# Patient Record
Sex: FEMALE | Race: BLACK OR AFRICAN AMERICAN | Employment: FULL TIME | URBAN - METROPOLITAN AREA
[De-identification: names, ages, dates, MRNs, and addresses within clinical notes are randomized per-mention and may not be internally consistent; named-entity substitution may affect disease eponyms.]

---

## 2019-06-02 ENCOUNTER — HOSPITAL ENCOUNTER (OUTPATIENT)
Age: 54
Discharge: HOME | End: 2019-06-02
Attending: FAMILY MEDICINE
Payer: COMMERCIAL

## 2019-06-02 VITALS
RESPIRATION RATE: 20 BRPM | TEMPERATURE: 99 F | WEIGHT: 289 LBS | OXYGEN SATURATION: 97 % | HEART RATE: 115 BPM | BODY MASS INDEX: 45.36 KG/M2 | HEIGHT: 67 IN | DIASTOLIC BLOOD PRESSURE: 83 MMHG | SYSTOLIC BLOOD PRESSURE: 133 MMHG

## 2019-06-02 DIAGNOSIS — J01.00 ACUTE NON-RECURRENT MAXILLARY SINUSITIS: Primary | ICD-10-CM

## 2019-06-02 DIAGNOSIS — H10.502 BLEPHAROCONJUNCTIVITIS OF LEFT EYE, UNSPECIFIED BLEPHAROCONJUNCTIVITIS TYPE: ICD-10-CM

## 2019-06-02 PROCEDURE — 99204 OFFICE O/P NEW MOD 45 MIN: CPT

## 2019-06-02 PROCEDURE — 99203 OFFICE O/P NEW LOW 30 MIN: CPT

## 2019-06-02 RX ORDER — AMOXICILLIN AND CLAVULANATE POTASSIUM 875; 125 MG/1; MG/1
1 TABLET, FILM COATED ORAL 2 TIMES DAILY
Qty: 20 TABLET | Refills: 0 | Status: SHIPPED | OUTPATIENT
Start: 2019-06-02 | End: 2019-06-12

## 2019-06-02 RX ORDER — BENZONATATE 100 MG/1
100 CAPSULE ORAL 3 TIMES DAILY PRN
Qty: 30 CAPSULE | Refills: 0 | Status: SHIPPED | OUTPATIENT
Start: 2019-06-02 | End: 2019-07-02

## 2019-06-02 NOTE — ED PROVIDER NOTES
Patient Seen in: [facility] In [facility]    History   Patient presents with:  Redness  Cold  Sinus Problem  Ear Pain    Stated Complaint: COLD/LARYNGITIS/LEFT EYE REDNESS X 1WK    HPI    26-year-old female with a history of hysterectomy and [06/02/19 1237]   /83   Pulse 115   Resp 20   Temp 98.5 °F (36.9 °C)   Temp src Temporal   SpO2 97 %   O2 Device None (Room air)       Current:/83   Pulse 115   Temp 98.5 °F (36.9 °C) (Temporal)   Resp 20   Ht 170.2 cm (5' 7\")   Wt 131.1 kg for 10 days. , Normal, Disp-20 tablet, R-0    benzonatate 100 MG Oral Cap  Take 1 capsule (100 mg total) by mouth 3 (three) times daily as needed for cough., Normal, Disp-30 capsule, R-0

## 2019-06-02 NOTE — ED INITIAL ASSESSMENT (HPI)
C/o started as scratchy throat a week ago, sinus/facial pressure, headache, stuffy nose, post nasal drip and a little cough, losing voice. Left eye redness started on Friday, shut closed this morning. Last night right ear pain and not able to hear good.

## 2019-10-06 ENCOUNTER — HOSPITAL ENCOUNTER (OUTPATIENT)
Age: 54
Discharge: HOME | End: 2019-10-06
Payer: COMMERCIAL

## 2019-10-06 ENCOUNTER — APPOINTMENT (OUTPATIENT)
Dept: GENERAL RADIOLOGY | Age: 54
End: 2019-10-06
Attending: NURSE PRACTITIONER
Payer: COMMERCIAL

## 2019-10-06 VITALS
HEART RATE: 95 BPM | OXYGEN SATURATION: 98 % | WEIGHT: 285 LBS | BODY MASS INDEX: 44.73 KG/M2 | RESPIRATION RATE: 20 BRPM | SYSTOLIC BLOOD PRESSURE: 145 MMHG | DIASTOLIC BLOOD PRESSURE: 99 MMHG | TEMPERATURE: 98 F | HEIGHT: 67 IN

## 2019-10-06 DIAGNOSIS — M54.50 LUMBAR PAIN: Primary | ICD-10-CM

## 2019-10-06 PROCEDURE — 72100 X-RAY EXAM L-S SPINE 2/3 VWS: CPT | Performed by: NURSE PRACTITIONER

## 2019-10-06 PROCEDURE — 96372 THER/PROPH/DIAG INJ SC/IM: CPT

## 2019-10-06 PROCEDURE — 99214 OFFICE O/P EST MOD 30 MIN: CPT

## 2019-10-06 RX ORDER — PREDNISONE 20 MG/1
40 TABLET ORAL DAILY
Qty: 10 TABLET | Refills: 0 | Status: SHIPPED | OUTPATIENT
Start: 2019-10-06 | End: 2019-10-11

## 2019-10-06 RX ORDER — CYCLOBENZAPRINE HCL 10 MG
10 TABLET ORAL 3 TIMES DAILY PRN
Qty: 20 TABLET | Refills: 0 | Status: SHIPPED | OUTPATIENT
Start: 2019-10-06 | End: 2019-10-13

## 2019-10-06 RX ORDER — KETOROLAC TROMETHAMINE 30 MG/ML
30 INJECTION, SOLUTION INTRAMUSCULAR; INTRAVENOUS ONCE
Status: COMPLETED | OUTPATIENT
Start: 2019-10-06 | End: 2019-10-06

## 2019-10-06 NOTE — ED PROVIDER NOTES
Patient Seen in: [facility] In [facility]      History   Patient presents with:  Back Pain (musculoskeletal)    Stated Complaint: back pain x 5 days    HPI  48year old female with history of lower back pain that comes and goes for years pres negative except as noted above.     Physical Exam     ED Triage Vitals [10/06/19 1304]   BP (!) 145/99   Pulse 95   Resp 20   Temp 98.3 °F (36.8 °C)   Temp src Temporal   SpO2 98 %   O2 Device None (Room air)       Current:BP (!) 145/99   Pulse 95   Temp 98 lower back pain for five days, with no injury. FINDINGS:    BONES:  Minimal levoscoliosis. Preservation lumbar vertebral body height. There is multilevel endplate hypertrophic changes throughout the lumbar spine. No spondylolisthesis.   There are dege

## 2020-11-06 ENCOUNTER — APPOINTMENT (OUTPATIENT)
Dept: CT IMAGING | Facility: HOSPITAL | Age: 55
End: 2020-11-06
Attending: EMERGENCY MEDICINE
Payer: COMMERCIAL

## 2020-11-06 ENCOUNTER — HOSPITAL ENCOUNTER (EMERGENCY)
Facility: HOSPITAL | Age: 55
Discharge: HOME | End: 2020-11-06
Attending: EMERGENCY MEDICINE
Payer: COMMERCIAL

## 2020-11-06 VITALS
OXYGEN SATURATION: 97 % | BODY MASS INDEX: 48.82 KG/M2 | HEIGHT: 65 IN | SYSTOLIC BLOOD PRESSURE: 138 MMHG | WEIGHT: 293 LBS | RESPIRATION RATE: 14 BRPM | DIASTOLIC BLOOD PRESSURE: 94 MMHG | HEART RATE: 87 BPM | TEMPERATURE: 97 F

## 2020-11-06 DIAGNOSIS — T81.49XA ABDOMINAL WALL ABSCESS AT SITE OF SURGICAL WOUND: ICD-10-CM

## 2020-11-06 DIAGNOSIS — L03.311 ABDOMINAL WALL CELLULITIS: Primary | ICD-10-CM

## 2020-11-06 PROCEDURE — 87205 SMEAR GRAM STAIN: CPT | Performed by: EMERGENCY MEDICINE

## 2020-11-06 PROCEDURE — 87070 CULTURE OTHR SPECIMN AEROBIC: CPT | Performed by: EMERGENCY MEDICINE

## 2020-11-06 PROCEDURE — 85025 COMPLETE CBC W/AUTO DIFF WBC: CPT | Performed by: EMERGENCY MEDICINE

## 2020-11-06 PROCEDURE — 80053 COMPREHEN METABOLIC PANEL: CPT | Performed by: EMERGENCY MEDICINE

## 2020-11-06 PROCEDURE — 87186 SC STD MICRODIL/AGAR DIL: CPT | Performed by: EMERGENCY MEDICINE

## 2020-11-06 PROCEDURE — 87077 CULTURE AEROBIC IDENTIFY: CPT | Performed by: EMERGENCY MEDICINE

## 2020-11-06 PROCEDURE — 36415 COLL VENOUS BLD VENIPUNCTURE: CPT

## 2020-11-06 PROCEDURE — 87040 BLOOD CULTURE FOR BACTERIA: CPT | Performed by: EMERGENCY MEDICINE

## 2020-11-06 PROCEDURE — 96375 TX/PRO/DX INJ NEW DRUG ADDON: CPT

## 2020-11-06 PROCEDURE — S0077 INJECTION, CLINDAMYCIN PHOSP: HCPCS | Performed by: EMERGENCY MEDICINE

## 2020-11-06 PROCEDURE — 99284 EMERGENCY DEPT VISIT MOD MDM: CPT

## 2020-11-06 PROCEDURE — 74177 CT ABD & PELVIS W/CONTRAST: CPT | Performed by: EMERGENCY MEDICINE

## 2020-11-06 PROCEDURE — 96365 THER/PROPH/DIAG IV INF INIT: CPT

## 2020-11-06 RX ORDER — CLINDAMYCIN PHOSPHATE 600 MG/50ML
600 INJECTION INTRAVENOUS ONCE
Status: COMPLETED | OUTPATIENT
Start: 2020-11-06 | End: 2020-11-06

## 2020-11-06 RX ORDER — LORAZEPAM 2 MG/ML
1 INJECTION INTRAMUSCULAR ONCE
Status: COMPLETED | OUTPATIENT
Start: 2020-11-06 | End: 2020-11-06

## 2020-11-06 RX ORDER — CLINDAMYCIN HYDROCHLORIDE 300 MG/1
300 CAPSULE ORAL 3 TIMES DAILY
Qty: 30 CAPSULE | Refills: 0 | Status: SHIPPED | OUTPATIENT
Start: 2020-11-06 | End: 2020-11-16

## 2020-11-06 RX ORDER — TRAMADOL HYDROCHLORIDE 50 MG/1
TABLET ORAL EVERY 6 HOURS PRN
Qty: 20 TABLET | Refills: 0 | Status: SHIPPED | OUTPATIENT
Start: 2020-11-06 | End: 2020-11-13

## 2020-11-06 NOTE — ED INITIAL ASSESSMENT (HPI)
A/o x3, pt with discharge to abd to hernia repair that happened x5 years ago, pt was laying in bed when noted discharge began yesterday night. Discomfort 5/10 on pain scale.

## 2020-11-06 NOTE — ED PROVIDER NOTES
Patient Seen in: [facility] Emergency Department      History   Patient presents with:  Abdomen/Flank Pain    Stated Complaint: abd pain    HPI    28-year-old female presents to the emergency department with complaints of drainage and area around he All other systems reviewed and are negative. Positive for stated complaint: abd pain  Other systems are as noted in HPI. Constitutional and vital signs reviewed. All other systems reviewed and negative except as noted above.     Physical Exam Cranial Nerves: No cranial nerve deficit. Coordination: Coordination normal.      Deep Tendon Reflexes: Reflexes are normal and symmetric.                   ED Course     Labs Reviewed   COMP METABOLIC PANEL (14) - Normal   CBC WITH DIFFERENTIAL WIT PROCEDURE:  CT ABDOMEN+PELVIS (CONTRAST ONLY) (CPT=74177)  COMPARISON:  External Exams, CT, CT ABDOMEN + PELVIS(CONTRAST ONLY) (CPT=74160/26985), 12/27/2014, 11:08 AM.  INDICATIONS:  abd pain  TECHNIQUE:  CT scanning was performed from the dome of the diap colon bulge toward this region. No sign of small bowel obstruction or colonic obstruction. No ascites. No adenopathy. ABDOMINAL WALL:  See above BOWEL/MESENTERY. URINARY BLADDER:  Unremarkable. PELVIC NODES:  Unremarkable.   PELVIC ORGANS:  No acute p Patient had IV established and blood work obtained. Blood cultures were obtained and she was given a dose of clindamycin.   Because there had been previous surgery in this area and there is some concern potentially about the integrity of that repair and my

## 2020-11-06 NOTE — ED NOTES
[facility] Patient is resting on cot in no apparent distress. Daughter is at bedside. Patient has no needs at this time.

## 2020-11-11 ENCOUNTER — OFFICE VISIT (OUTPATIENT)
Dept: SURGERY | Facility: CLINIC | Age: 55
End: 2020-11-11
Payer: COMMERCIAL

## 2020-11-11 VITALS
SYSTOLIC BLOOD PRESSURE: 146 MMHG | BODY MASS INDEX: 50 KG/M2 | DIASTOLIC BLOOD PRESSURE: 99 MMHG | RESPIRATION RATE: 16 BRPM | WEIGHT: 293 LBS | HEART RATE: 94 BPM | TEMPERATURE: 98 F

## 2020-11-11 DIAGNOSIS — T14.8XXA INFECTED WOUND: Primary | ICD-10-CM

## 2020-11-11 DIAGNOSIS — L08.9 INFECTED WOUND: Primary | ICD-10-CM

## 2020-11-11 PROCEDURE — 3077F SYST BP >= 140 MM HG: CPT | Performed by: SURGERY

## 2020-11-11 PROCEDURE — 99204 OFFICE O/P NEW MOD 45 MIN: CPT | Performed by: SURGERY

## 2020-11-11 PROCEDURE — 3080F DIAST BP >= 90 MM HG: CPT | Performed by: SURGERY

## 2020-11-11 NOTE — H&P
New Patient Visit Note       Active Problems      1. Infected wound        Chief Complaint   Patient presents with:  Hernia: NP  went to [facility] ER for bulge around navel area. Pt states 'last Friday went to ER for pink reddish fluid leaking around navel.  Did h Relation Age of Onset   • Thyroid disease Mother    • Hypertension Mother      Social History    Socioeconomic History      Marital status:       Spouse name: Not on file      Number of children: Not on file      Years of education: Not on file weakness. Hematological: Negative for adenopathy. Does not bruise/bleed easily. Psychiatric/Behavioral: Negative for behavioral problems and sleep disturbance.        Physical Findings   BP (!) 146/99   Pulse 94   Temp 98.1 °F (36.7 °C)   Resp 16   Wt 3 enhancement features typical for   hemangioma and measures 3.1 cm. Buddy Roughen BILIARY:  Cholecystectomy. PANCREAS:  Unremarkable. SPLEEN:  Unremarkable. KIDNEYS:  No acute abnormality. ADRENALS:  Unremarkable.      AORTA/VASCULAR:  No aortic an encounter diagnosis)      Plan   · I suspect the patient has developed an infection of the ventral herniorrhaphy mesh, though that is extremely rare 5-1/2 years after the mesh was placed.   At the minimum, the patient has a subcutaneous area of inflammation

## 2020-11-11 NOTE — H&P (VIEW-ONLY)
New Patient Visit Note       Active Problems      1. Infected wound        Chief Complaint   Patient presents with:  Hernia: NP  went to [facility] ER for bulge around navel area. Pt states 'last Friday went to ER for pink reddish fluid leaking around navel.  Did h Problem Relation Age of Onset   • Thyroid disease Mother    • Hypertension Mother      Social History    Socioeconomic History      Marital status:       Spouse name: Not on file      Number of children: Not on file      Years of education: Not on Neurological: Negative for tremors, syncope and weakness. Hematological: Negative for adenopathy. Does not bruise/bleed easily. Psychiatric/Behavioral: Negative for behavioral problems and sleep disturbance.        Physical Findings   BP (!) 146/99   Pu LIVER:  Stable lesion in the right hepatic lobe most likely hemangioma, present on prior exam as far back as December 2014, nearly 6 years ago.   This is present in the lateral aspect of the right hepatic lobe, shows enhancement features typical for   heman anterior to this in the subcutaneous tissues. The integrity of the mesh cannot be determined with certainty. No fluid collection or specific abscess, but given the drainage present coming from the wound, soft tissue infection is suspected.   No bowel   ob

## 2020-11-16 RX ORDER — ACETAMINOPHEN 500 MG
1000 TABLET ORAL ONCE
Status: CANCELLED | OUTPATIENT
Start: 2020-11-16 | End: 2020-11-16

## 2020-11-25 ENCOUNTER — TELEPHONE (OUTPATIENT)
Dept: SURGERY | Facility: CLINIC | Age: 55
End: 2020-11-25

## 2020-12-01 ENCOUNTER — APPOINTMENT (OUTPATIENT)
Dept: LAB | Age: 55
End: 2020-12-01
Attending: SURGERY
Payer: COMMERCIAL

## 2020-12-01 DIAGNOSIS — L08.9 INFECTED WOUND: ICD-10-CM

## 2020-12-01 DIAGNOSIS — T14.8XXA INFECTED WOUND: ICD-10-CM

## 2020-12-02 ENCOUNTER — TELEPHONE (OUTPATIENT)
Dept: SURGERY | Facility: CLINIC | Age: 55
End: 2020-12-02

## 2020-12-02 DIAGNOSIS — L08.9 INFECTED WOUND: Primary | ICD-10-CM

## 2020-12-02 DIAGNOSIS — T14.8XXA INFECTED WOUND: Primary | ICD-10-CM

## 2020-12-02 RX ORDER — CLINDAMYCIN HYDROCHLORIDE 300 MG/1
300 CAPSULE ORAL 3 TIMES DAILY
Qty: 90 CAPSULE | Refills: 0 | Status: SHIPPED | OUTPATIENT
Start: 2020-12-02 | End: 2020-12-02

## 2020-12-02 RX ORDER — CLINDAMYCIN HYDROCHLORIDE 300 MG/1
300 CAPSULE ORAL 3 TIMES DAILY
Qty: 30 CAPSULE | Refills: 0 | Status: ON HOLD | OUTPATIENT
Start: 2020-12-02 | End: 2020-12-11

## 2020-12-03 ENCOUNTER — TELEPHONE (OUTPATIENT)
Dept: SURGERY | Facility: CLINIC | Age: 55
End: 2020-12-03

## 2020-12-04 ENCOUNTER — APPOINTMENT (OUTPATIENT)
Dept: LAB | Age: 55
End: 2020-12-04
Attending: SURGERY
Payer: COMMERCIAL

## 2020-12-04 DIAGNOSIS — T14.8XXA INFECTED WOUND: ICD-10-CM

## 2020-12-04 DIAGNOSIS — L08.9 INFECTED WOUND: ICD-10-CM

## 2020-12-06 ENCOUNTER — ANESTHESIA EVENT (OUTPATIENT)
Dept: SURGERY | Facility: HOSPITAL | Age: 55
DRG: 908 | End: 2020-12-06
Payer: COMMERCIAL

## 2020-12-06 NOTE — ANESTHESIA PREPROCEDURE EVALUATION
PRE-OP EVALUATION    Patient Name: Milton Gamble    Pre-op Diagnosis: Infected wound [T14. 8XXA, L08.9]    Procedure(s):  ABDOMINAL WOUND EXPLORATION, POSSIBLE VENTRAL HERNIA MESH REMOVAL AND POSSIBLE REPAIR OF HERNIA    Surgeon(s) and Role:     * Anca Neff Results   Component Value Date    WBC 6.4 11/06/2020    RBC 4.42 11/06/2020    HGB 13.1 11/06/2020    HCT 39.8 11/06/2020    MCV 90.0 11/06/2020    MCH 29.6 11/06/2020    MCHC 32.9 11/06/2020    RDW 13.8 11/06/2020    .0 11/06/2020     Lab Results

## 2020-12-07 ENCOUNTER — HOSPITAL ENCOUNTER (INPATIENT)
Facility: HOSPITAL | Age: 55
LOS: 3 days | Discharge: HOME | DRG: 908 | End: 2020-12-11
Attending: SURGERY | Admitting: SURGERY
Payer: COMMERCIAL

## 2020-12-07 ENCOUNTER — ANESTHESIA (OUTPATIENT)
Dept: SURGERY | Facility: HOSPITAL | Age: 55
DRG: 908 | End: 2020-12-07
Payer: COMMERCIAL

## 2020-12-07 DIAGNOSIS — L08.9 INFECTED WOUND: Primary | ICD-10-CM

## 2020-12-07 DIAGNOSIS — T14.8XXA INFECTED WOUND: Primary | ICD-10-CM

## 2020-12-07 PROBLEM — T85.79XA INFECTED PROSTHETIC MESH OF ABDOMINAL WALL (HCC): Status: ACTIVE | Noted: 2020-12-07

## 2020-12-07 PROCEDURE — 0DBE0ZZ EXCISION OF LARGE INTESTINE, OPEN APPROACH: ICD-10-PCS | Performed by: SURGERY

## 2020-12-07 PROCEDURE — 88305 TISSUE EXAM BY PATHOLOGIST: CPT | Performed by: SURGERY

## 2020-12-07 PROCEDURE — 0DN80ZZ RELEASE SMALL INTESTINE, OPEN APPROACH: ICD-10-PCS | Performed by: SURGERY

## 2020-12-07 PROCEDURE — 0WPF0JZ REMOVAL OF SYNTHETIC SUBSTITUTE FROM ABDOMINAL WALL, OPEN APPROACH: ICD-10-PCS | Performed by: SURGERY

## 2020-12-07 PROCEDURE — 76942 ECHO GUIDE FOR BIOPSY: CPT | Performed by: ANESTHESIOLOGY

## 2020-12-07 PROCEDURE — 88307 TISSUE EXAM BY PATHOLOGIST: CPT | Performed by: SURGERY

## 2020-12-07 PROCEDURE — 0DB80ZZ EXCISION OF SMALL INTESTINE, OPEN APPROACH: ICD-10-PCS | Performed by: SURGERY

## 2020-12-07 PROCEDURE — S0028 INJECTION, FAMOTIDINE, 20 MG: HCPCS | Performed by: PHYSICIAN ASSISTANT

## 2020-12-07 PROCEDURE — 0WQF0ZZ REPAIR ABDOMINAL WALL, OPEN APPROACH: ICD-10-PCS | Performed by: SURGERY

## 2020-12-07 RX ORDER — ONDANSETRON 2 MG/ML
4 INJECTION INTRAMUSCULAR; INTRAVENOUS AS NEEDED
Status: DISCONTINUED | OUTPATIENT
Start: 2020-12-07 | End: 2020-12-07 | Stop reason: HOSPADM

## 2020-12-07 RX ORDER — HYDROMORPHONE HYDROCHLORIDE 1 MG/ML
INJECTION, SOLUTION INTRAMUSCULAR; INTRAVENOUS; SUBCUTANEOUS
Status: COMPLETED
Start: 2020-12-07 | End: 2020-12-07

## 2020-12-07 RX ORDER — HEPARIN SODIUM 5000 [USP'U]/ML
5000 INJECTION, SOLUTION INTRAVENOUS; SUBCUTANEOUS ONCE
Status: COMPLETED | OUTPATIENT
Start: 2020-12-07 | End: 2020-12-07

## 2020-12-07 RX ORDER — HYDROMORPHONE HYDROCHLORIDE 1 MG/ML
0.4 INJECTION, SOLUTION INTRAMUSCULAR; INTRAVENOUS; SUBCUTANEOUS EVERY 5 MIN PRN
Status: DISCONTINUED | OUTPATIENT
Start: 2020-12-07 | End: 2020-12-07 | Stop reason: HOSPADM

## 2020-12-07 RX ORDER — SODIUM PHOSPHATE, DIBASIC AND SODIUM PHOSPHATE, MONOBASIC 7; 19 G/133ML; G/133ML
1 ENEMA RECTAL ONCE AS NEEDED
Status: DISCONTINUED | OUTPATIENT
Start: 2020-12-07 | End: 2020-12-11

## 2020-12-07 RX ORDER — HYDROCODONE BITARTRATE AND ACETAMINOPHEN 5; 325 MG/1; MG/1
1 TABLET ORAL AS NEEDED
Status: DISCONTINUED | OUTPATIENT
Start: 2020-12-07 | End: 2020-12-07 | Stop reason: HOSPADM

## 2020-12-07 RX ORDER — FAMOTIDINE 10 MG/ML
20 INJECTION, SOLUTION INTRAVENOUS 2 TIMES DAILY
Status: DISCONTINUED | OUTPATIENT
Start: 2020-12-07 | End: 2020-12-11

## 2020-12-07 RX ORDER — DEXAMETHASONE SODIUM PHOSPHATE 4 MG/ML
VIAL (ML) INJECTION AS NEEDED
Status: DISCONTINUED | OUTPATIENT
Start: 2020-12-07 | End: 2020-12-07 | Stop reason: SURG

## 2020-12-07 RX ORDER — FAMOTIDINE 20 MG/1
20 TABLET ORAL 2 TIMES DAILY
Status: DISCONTINUED | OUTPATIENT
Start: 2020-12-07 | End: 2020-12-11

## 2020-12-07 RX ORDER — ONDANSETRON 2 MG/ML
INJECTION INTRAMUSCULAR; INTRAVENOUS AS NEEDED
Status: DISCONTINUED | OUTPATIENT
Start: 2020-12-07 | End: 2020-12-07 | Stop reason: SURG

## 2020-12-07 RX ORDER — BACITRACIN 50000 [USP'U]/1
INJECTION, POWDER, LYOPHILIZED, FOR SOLUTION INTRAMUSCULAR AS NEEDED
Status: DISCONTINUED | OUTPATIENT
Start: 2020-12-07 | End: 2020-12-07 | Stop reason: HOSPADM

## 2020-12-07 RX ORDER — BISACODYL 10 MG
10 SUPPOSITORY, RECTAL RECTAL
Status: DISCONTINUED | OUTPATIENT
Start: 2020-12-07 | End: 2020-12-11

## 2020-12-07 RX ORDER — ACETAMINOPHEN 500 MG
500 TABLET ORAL EVERY 6 HOURS PRN
Status: ON HOLD | COMMUNITY
End: 2020-12-11

## 2020-12-07 RX ORDER — HYDROCODONE BITARTRATE AND ACETAMINOPHEN 5; 325 MG/1; MG/1
2 TABLET ORAL EVERY 4 HOURS PRN
Status: DISCONTINUED | OUTPATIENT
Start: 2020-12-07 | End: 2020-12-10

## 2020-12-07 RX ORDER — BUPIVACAINE HYDROCHLORIDE 5 MG/ML
INJECTION, SOLUTION EPIDURAL; INTRACAUDAL AS NEEDED
Status: DISCONTINUED | OUTPATIENT
Start: 2020-12-07 | End: 2020-12-07 | Stop reason: SURG

## 2020-12-07 RX ORDER — METOCLOPRAMIDE HYDROCHLORIDE 5 MG/ML
10 INJECTION INTRAMUSCULAR; INTRAVENOUS AS NEEDED
Status: DISCONTINUED | OUTPATIENT
Start: 2020-12-07 | End: 2020-12-07 | Stop reason: HOSPADM

## 2020-12-07 RX ORDER — SODIUM CHLORIDE, SODIUM LACTATE, POTASSIUM CHLORIDE, CALCIUM CHLORIDE 600; 310; 30; 20 MG/100ML; MG/100ML; MG/100ML; MG/100ML
INJECTION, SOLUTION INTRAVENOUS CONTINUOUS
Status: DISCONTINUED | OUTPATIENT
Start: 2020-12-07 | End: 2020-12-07 | Stop reason: HOSPADM

## 2020-12-07 RX ORDER — CEFAZOLIN SODIUM/WATER 2 G/20 ML
2 SYRINGE (ML) INTRAVENOUS EVERY 8 HOURS
Status: DISCONTINUED | OUTPATIENT
Start: 2020-12-08 | End: 2020-12-07

## 2020-12-07 RX ORDER — HYDROMORPHONE HYDROCHLORIDE 1 MG/ML
0.2 INJECTION, SOLUTION INTRAMUSCULAR; INTRAVENOUS; SUBCUTANEOUS EVERY 2 HOUR PRN
Status: DISCONTINUED | OUTPATIENT
Start: 2020-12-07 | End: 2020-12-11

## 2020-12-07 RX ORDER — HYDROMORPHONE HYDROCHLORIDE 1 MG/ML
0.8 INJECTION, SOLUTION INTRAMUSCULAR; INTRAVENOUS; SUBCUTANEOUS EVERY 2 HOUR PRN
Status: DISCONTINUED | OUTPATIENT
Start: 2020-12-07 | End: 2020-12-11

## 2020-12-07 RX ORDER — ACETAMINOPHEN 325 MG/1
650 TABLET ORAL EVERY 4 HOURS PRN
Status: DISCONTINUED | OUTPATIENT
Start: 2020-12-07 | End: 2020-12-10

## 2020-12-07 RX ORDER — LABETALOL HYDROCHLORIDE 5 MG/ML
INJECTION, SOLUTION INTRAVENOUS AS NEEDED
Status: DISCONTINUED | OUTPATIENT
Start: 2020-12-07 | End: 2020-12-07 | Stop reason: SURG

## 2020-12-07 RX ORDER — LIDOCAINE HYDROCHLORIDE 10 MG/ML
INJECTION, SOLUTION EPIDURAL; INFILTRATION; INTRACAUDAL; PERINEURAL AS NEEDED
Status: DISCONTINUED | OUTPATIENT
Start: 2020-12-07 | End: 2020-12-07 | Stop reason: SURG

## 2020-12-07 RX ORDER — POLYETHYLENE GLYCOL 3350 17 G/17G
17 POWDER, FOR SOLUTION ORAL DAILY PRN
Status: DISCONTINUED | OUTPATIENT
Start: 2020-12-07 | End: 2020-12-11

## 2020-12-07 RX ORDER — HYDROCODONE BITARTRATE AND ACETAMINOPHEN 5; 325 MG/1; MG/1
1 TABLET ORAL EVERY 4 HOURS PRN
Status: DISCONTINUED | OUTPATIENT
Start: 2020-12-07 | End: 2020-12-10

## 2020-12-07 RX ORDER — HYDROMORPHONE HYDROCHLORIDE 1 MG/ML
0.4 INJECTION, SOLUTION INTRAMUSCULAR; INTRAVENOUS; SUBCUTANEOUS EVERY 2 HOUR PRN
Status: DISCONTINUED | OUTPATIENT
Start: 2020-12-07 | End: 2020-12-11

## 2020-12-07 RX ORDER — DEXAMETHASONE SODIUM PHOSPHATE 4 MG/ML
4 VIAL (ML) INJECTION AS NEEDED
Status: DISCONTINUED | OUTPATIENT
Start: 2020-12-07 | End: 2020-12-07 | Stop reason: HOSPADM

## 2020-12-07 RX ORDER — HEPARIN SODIUM 5000 [USP'U]/ML
7500 INJECTION, SOLUTION INTRAVENOUS; SUBCUTANEOUS EVERY 8 HOURS SCHEDULED
Status: DISCONTINUED | OUTPATIENT
Start: 2020-12-08 | End: 2020-12-11

## 2020-12-07 RX ORDER — NALOXONE HYDROCHLORIDE 0.4 MG/ML
80 INJECTION, SOLUTION INTRAMUSCULAR; INTRAVENOUS; SUBCUTANEOUS AS NEEDED
Status: DISCONTINUED | OUTPATIENT
Start: 2020-12-07 | End: 2020-12-07 | Stop reason: HOSPADM

## 2020-12-07 RX ORDER — DOCUSATE SODIUM 100 MG/1
100 CAPSULE, LIQUID FILLED ORAL 2 TIMES DAILY
Status: DISCONTINUED | OUTPATIENT
Start: 2020-12-07 | End: 2020-12-11

## 2020-12-07 RX ORDER — HYDROCODONE BITARTRATE AND ACETAMINOPHEN 5; 325 MG/1; MG/1
2 TABLET ORAL AS NEEDED
Status: DISCONTINUED | OUTPATIENT
Start: 2020-12-07 | End: 2020-12-07 | Stop reason: HOSPADM

## 2020-12-07 RX ORDER — SODIUM CHLORIDE 9 MG/ML
INJECTION, SOLUTION INTRAVENOUS CONTINUOUS
Status: DISCONTINUED | OUTPATIENT
Start: 2020-12-07 | End: 2020-12-11

## 2020-12-07 RX ORDER — METOCLOPRAMIDE HYDROCHLORIDE 5 MG/ML
INJECTION INTRAMUSCULAR; INTRAVENOUS AS NEEDED
Status: DISCONTINUED | OUTPATIENT
Start: 2020-12-07 | End: 2020-12-07 | Stop reason: SURG

## 2020-12-07 RX ORDER — ONDANSETRON 2 MG/ML
4 INJECTION INTRAMUSCULAR; INTRAVENOUS EVERY 6 HOURS PRN
Status: DISCONTINUED | OUTPATIENT
Start: 2020-12-07 | End: 2020-12-11

## 2020-12-07 RX ORDER — ROCURONIUM BROMIDE 10 MG/ML
INJECTION, SOLUTION INTRAVENOUS AS NEEDED
Status: DISCONTINUED | OUTPATIENT
Start: 2020-12-07 | End: 2020-12-07 | Stop reason: SURG

## 2020-12-07 RX ADMIN — ROCURONIUM BROMIDE 15 MG: 10 INJECTION, SOLUTION INTRAVENOUS at 13:58:00

## 2020-12-07 RX ADMIN — ROCURONIUM BROMIDE 15 MG: 10 INJECTION, SOLUTION INTRAVENOUS at 14:29:00

## 2020-12-07 RX ADMIN — LIDOCAINE HYDROCHLORIDE 50 MG: 10 INJECTION, SOLUTION EPIDURAL; INFILTRATION; INTRACAUDAL; PERINEURAL at 12:23:00

## 2020-12-07 RX ADMIN — METOCLOPRAMIDE HYDROCHLORIDE 10 MG: 5 INJECTION INTRAMUSCULAR; INTRAVENOUS at 12:23:00

## 2020-12-07 RX ADMIN — SODIUM CHLORIDE, SODIUM LACTATE, POTASSIUM CHLORIDE, CALCIUM CHLORIDE: 600; 310; 30; 20 INJECTION, SOLUTION INTRAVENOUS at 17:02:00

## 2020-12-07 RX ADMIN — ROCURONIUM BROMIDE 40 MG: 10 INJECTION, SOLUTION INTRAVENOUS at 12:23:00

## 2020-12-07 RX ADMIN — DEXAMETHASONE SODIUM PHOSPHATE 4 MG: 4 MG/ML VIAL (ML) INJECTION at 12:23:00

## 2020-12-07 RX ADMIN — BUPIVACAINE HYDROCHLORIDE 30 ML: 5 INJECTION, SOLUTION EPIDURAL; INTRACAUDAL at 16:35:00

## 2020-12-07 RX ADMIN — ONDANSETRON 4 MG: 2 INJECTION INTRAMUSCULAR; INTRAVENOUS at 12:23:00

## 2020-12-07 RX ADMIN — LABETALOL HYDROCHLORIDE 5 MG: 5 INJECTION, SOLUTION INTRAVENOUS at 13:01:00

## 2020-12-07 RX ADMIN — LABETALOL HYDROCHLORIDE 5 MG: 5 INJECTION, SOLUTION INTRAVENOUS at 13:09:00

## 2020-12-07 RX ADMIN — ROCURONIUM BROMIDE 10 MG: 10 INJECTION, SOLUTION INTRAVENOUS at 13:14:00

## 2020-12-07 NOTE — ANESTHESIA PROCEDURE NOTES
Airway  Date/Time: 12/7/2020 12:24 PM  Urgency: elective      General Information and Staff    Patient location during procedure: OR  Anesthesiologist: Carie Mendosa MD  Performed: anesthesiologist     Indications and Patient Condition  Indications for air

## 2020-12-07 NOTE — ANESTHESIA PROCEDURE NOTES
Regional Block  Performed by: Zayra Wahl MD  Authorized by: Zayra Wahl MD       General Information and Staff    Start Time:  12/7/2020 4:25 PM  End Time:  12/7/2020 4:35 PM  Anesthesiologist:  Zayra Wahl MD  Patient Location:  OR      Site Identi

## 2020-12-07 NOTE — INTERVAL H&P NOTE
Pre-op Diagnosis: Infected wound [T14. 8XXA, L08.9]    The above referenced H&P was reviewed by Christal Seals MD on 12/7/2020, the patient was examined and no significant changes have occurred in the patient's condition since the H&P was performed.   I

## 2020-12-07 NOTE — ANESTHESIA POSTPROCEDURE EVALUATION
[address] Patient Status:  Hospital Outpatient Surgery   Age/Gender 50year old female MRN [id number]   Location [address] Attending Cristobal Brownlee MD   Hosp Day # 0 PCP MD Leilani Stephens

## 2020-12-08 PROBLEM — T14.8XXA INFECTED WOUND: Status: ACTIVE | Noted: 2020-12-08

## 2020-12-08 PROBLEM — L08.9 INFECTED WOUND: Status: ACTIVE | Noted: 2020-12-08

## 2020-12-08 PROCEDURE — S0028 INJECTION, FAMOTIDINE, 20 MG: HCPCS | Performed by: PHYSICIAN ASSISTANT

## 2020-12-08 PROCEDURE — 97161 PT EVAL LOW COMPLEX 20 MIN: CPT | Performed by: PHYSICAL THERAPIST

## 2020-12-08 PROCEDURE — 97530 THERAPEUTIC ACTIVITIES: CPT | Performed by: PHYSICAL THERAPIST

## 2020-12-08 PROCEDURE — 97116 GAIT TRAINING THERAPY: CPT | Performed by: PHYSICAL THERAPIST

## 2020-12-08 NOTE — PHYSICAL THERAPY NOTE
PHYSICAL THERAPY QUICK EVALUATION - INPATIENT    Room Number: 0017/0017-A  Evaluation Date: 12/8/2020  Presenting Problem: (s/p infected prosthestic mesh of abdominal wall )  Physician Order: PT Eval and Treat    Problem List  Principal Problem:    Infec when I get home. OBJECTIVE  Precautions: Drain(s); Other (Comment)(NG tube, incision )  Fall Risk: Standard fall risk    WEIGHT BEARING RESTRICTION                   PAIN ASSESSMENT  Ratin  Location: abdominal   Management Techniques:  Activity promo roll with min A to OOB, supine>sit to EOB, with cues needed for sequence and safety. Sit on EOB x5' without complaints of pain or dizziness. Sit>stand without AD, level surfaces, uses IV pole to assist with mobility  and safety.   Reviewed stairs and us transfer Safely and independently   Patient able to ambulate on level surfaces Safely and independently

## 2020-12-08 NOTE — OPERATIVE REPORT
[facility]  Op Note    Enriqueta Morales Location: OR   [facility] [id number] MRN [id number]   Admission Date 12/7/2020 Operation Date 12/7/2020   Attending Physician Janice Stone MD Operating Physician Briana Isbell MD     DATE OF OPERATION:  12/7/20 granuloma. Cautery was used to obtain hemostasis and to dissect down to the fascial plane. At this point, it was seen that the granulation tissue extended all the way down to the mesh, indicating a chronic mesh infection.   The fascia around this draining suture. Succus was milked past the anastomosis, and no leaks were noted. Attention was then turned to the area of fistula on the transverse colon. The indurated area measured only about 2 inches. It was decided to resect this portion of the colon.   The condition.     Marita Joaquin MD

## 2020-12-08 NOTE — PROGRESS NOTES
[facility] Pharmacy Note: Antimicrobial Weight Based Dose Adjustment for: piperacillin/tazobactam (Dana Toro)    Lisa Raphael is a 50year old patient who has been prescribed piperacillin/tazobactam (ZOSYN) 3.375 gm every 8 hours.     CrCl cannot be calculated (Rahda

## 2020-12-08 NOTE — PLAN OF CARE
A&Ox4. VSS. Rates pain 8/10, dilaudid administered. NGT to low intermittent suction. Abdominal incision JONNATHAN due to dressing. Dressing is clean, dry, and intact. BAMBI drain with serosanguinous drainage. Abdomen soft and tender. Bowel sounds hypoactive.  IVF in development  - Assess and document skin integrity  - Monitor for areas of redness and/or skin breakdown  - Initiate interventions, skin care algorithm/standards of care as needed  Outcome: Progressing  Goal: Incision(s), wounds(s) or drain site(s) healing assessment.  - Educate pt/family on patient safety including physical limitations  - Instruct pt to call for assistance with activity based on assessment  - Modify environment to reduce risk of injury  - Provide assistive devices as appropriate  - Consider

## 2020-12-08 NOTE — PROGRESS NOTES
[facility]  Progress Note    Claudette Forrest Patient Status:  Outpatient in a Bed    1965 MRN [id number]   [facility] 0SW-A Attending Cristobal Brownlee MD   Hosp Day # 0 PCP Clifford Rossi MD     Subjective:  Patient resting in bed. prophylaxis  9. GI prophylaxis    Stevenson Vaughanma  12/8/2020  10:36 AM    ADDENDUM:     Patient was seen and examined. She denies flatus or bowel movement. She is having incisional pain. General: Alert, orientated x3. Cooperative.  No apparent dist

## 2020-12-08 NOTE — PAYOR COMM NOTE
--------------  ADMISSION REVIEW     Payor: [address] [facility] #:  [id number]  Authorization Number: [id number]    Admit date: 12/8/20  Admit time: 46       Admitting Physician: Paul Guerrero MD  Attending Physician: with no rebound or guarding. No peritoneal signs. Incision:  Clean, dry, intact without erythema.     BAMBI drain: 125cc output since OR, SS      POD 1 abdominal wound exploration, removal of infected mesh, lysis of adhesions for 90 minutes, small bowel rese Abdomen) Ting Handley RN    12/8/2020 0014 Given 7,500 Units Subcutaneous (Left Lower Abdomen) Ting Handley RN      HYDROmorphone HCl (DILAUDID) 1 MG/ML injection 0.4 mg     Date Action Dose Route User    12/8/2020 1355 Given 0.4 mg Intravenous Lali Perez,

## 2020-12-08 NOTE — PLAN OF CARE
NURSING ADMISSION NOTE      Patient admitted via  PACU bed  Oriented to room. Safety precautions initiated. Bed in low position. Call light in reach. Pt a&ox4 on assessment, VSS, pt afebrile. Tele monitor in place.  Reporting severe pain to abdome medications  - Encourage mobilization and activity  - Obtain nutritional consult as needed  - Establish a toileting routine/schedule  - Consider collaborating with pharmacy to review patient's medication profile  Outcome: Progressing     Problem: SKIN/TISS INTERVENTIONS  - Monitor WBC  - Administer growth factors as ordered  - Implement neutropenic guidelines  Outcome: Progressing     Problem: SAFETY ADULT - FALL  Goal: Free from fall injury  Description: INTERVENTIONS:  - Assess pt frequently for physical n

## 2020-12-08 NOTE — PROGRESS NOTES
[facility] Pharmacy Note:  Anticoagulation Weight Dose Adjustment for heparin    Jn Allen is a 50year old patient who has been prescribed heparin 5000 units every 8 hours.       CrCl cannot be calculated (Patient's most recent lab result is older than the max

## 2020-12-09 ENCOUNTER — APPOINTMENT (OUTPATIENT)
Dept: CT IMAGING | Facility: HOSPITAL | Age: 55
DRG: 908 | End: 2020-12-09
Attending: SURGERY
Payer: COMMERCIAL

## 2020-12-09 PROCEDURE — 85018 HEMOGLOBIN: CPT | Performed by: SURGERY

## 2020-12-09 PROCEDURE — 85014 HEMATOCRIT: CPT | Performed by: SURGERY

## 2020-12-09 PROCEDURE — 71275 CT ANGIOGRAPHY CHEST: CPT | Performed by: SURGERY

## 2020-12-09 PROCEDURE — 80053 COMPREHEN METABOLIC PANEL: CPT | Performed by: SURGERY

## 2020-12-09 NOTE — PLAN OF CARE
Pt is alert and oriented x4. Lungs are clear bilaterally on room air. Bowel sounds are hypoactive. Pt denies passing flatus. Denies nausea. NPO. NG to right jasso. LIS. Midline abdominal incision with abd and abdominal binder. BAMBI  to bulb suction.  Reports a Assess and document dressing/incision, wound bed, drain sites and surrounding tissue  - Implement wound care per orders  - Initiate isolation precautions as appropriate  - Initiate Pressure Ulcer prevention bundle as indicated  Outcome: Progressing     Pro facility with appropriate resources  Description: INTERVENTIONS:  - Identify barriers to discharge w/pt and caregiver  - Include patient/family/discharge partner in discharge planning  - Arrange for needed discharge resources and transportation as appropri

## 2020-12-09 NOTE — PROGRESS NOTES
[facility]  Progress Note    Janae Bernard Patient Status:  Inpatient    1965 MRN [id number]   [facility] 0SW-A Attending Myrna Da Silva MD   Hosp Day # 1 PCP Miladys Garner MD     Subjective:   The patient has been up ambulatin

## 2020-12-09 NOTE — PLAN OF CARE
NG tube clamped per MD's orders, patient instructed to notify writing RN if she develops any nausea or vomiting and plan of care to check residuals after 6 hours, patient verbalizes understanding.

## 2020-12-10 PROCEDURE — 84132 ASSAY OF SERUM POTASSIUM: CPT | Performed by: SURGERY

## 2020-12-10 RX ORDER — ACETAMINOPHEN 500 MG
1000 TABLET ORAL EVERY 8 HOURS
Status: DISCONTINUED | OUTPATIENT
Start: 2020-12-10 | End: 2020-12-11

## 2020-12-10 RX ORDER — OXYCODONE HYDROCHLORIDE 10 MG/1
10 TABLET ORAL EVERY 4 HOURS PRN
Status: DISCONTINUED | OUTPATIENT
Start: 2020-12-10 | End: 2020-12-11

## 2020-12-10 RX ORDER — OXYCODONE HYDROCHLORIDE 5 MG/1
5 TABLET ORAL EVERY 4 HOURS PRN
Status: DISCONTINUED | OUTPATIENT
Start: 2020-12-10 | End: 2020-12-11

## 2020-12-10 RX ORDER — OXYCODONE HYDROCHLORIDE 5 MG/1
2.5 TABLET ORAL EVERY 4 HOURS PRN
Status: DISCONTINUED | OUTPATIENT
Start: 2020-12-10 | End: 2020-12-11

## 2020-12-10 NOTE — PLAN OF CARE
Pt is alert and oriented x4. On room air. Pt is using IS at the bedside. Tele monitor in place. ST. Bowel sounds are hypoactive. Pt reports passing flatus. Tolerating sips of clears. Midline incision to abdomen with staples clean dry and intact.  CHG wipes Incision(s), wounds(s) or drain site(s) healing without S/S of infection  Description: INTERVENTIONS:  - Assess and document risk factors for pressure ulcer development  - Assess and document skin integrity  - Assess and document dressing/incision, wound b assistive devices as appropriate  - Consider OT/PT consult to assist with strengthening/mobility  - Encourage toileting schedule  Outcome: Progressing     Problem: DISCHARGE PLANNING  Goal: Discharge to home or other facility with appropriate resources  Mervat

## 2020-12-10 NOTE — PROGRESS NOTES
[facility]  Progress Note    Amparo Finney Patient Status:  Inpatient    1965 MRN [id number]   [facility] 0SW-A Attending Ambar Watson MD   Hosp Day # 2 PCP Darwin Rey MD     Subjective:   The patient is sitting in her emeterio Nisreen Paul PA-C  [id number]  7:58 AM    ADDENDUM:     Patient was seen and examined. She has been walking. +Flatus, No BM. Not hungry. Is scared to eat. General: Alert, orientated x3. Cooperative. No apparent distress.   Abdomen: Soft, non

## 2020-12-10 NOTE — PLAN OF CARE
Patient A/O X 4. VSS. Ambulating in hallways, sitting up in chair. Tolerating clear liquids. Denies pain.     Problem: Patient/Family Goals  Goal: Patient/Family Long Term Goal  Description: Patient's Long Term Goal: Discharge home    Interventions:  -   - infection  Description: INTERVENTIONS:  - Assess and document risk factors for pressure ulcer development  - Assess and document skin integrity  - Assess and document dressing/incision, wound bed, drain sites and surrounding tissue  - Implement wound care Identify cognitive and physical deficits and behaviors that affect risk of falls.   - Crowley fall precautions as indicated by assessment.  - Educate pt/family on patient safety including physical limitations  - Instruct pt to call for assistance with act

## 2020-12-11 VITALS
HEIGHT: 69 IN | HEART RATE: 80 BPM | TEMPERATURE: 98 F | SYSTOLIC BLOOD PRESSURE: 142 MMHG | RESPIRATION RATE: 18 BRPM | OXYGEN SATURATION: 98 % | DIASTOLIC BLOOD PRESSURE: 85 MMHG | BODY MASS INDEX: 43.4 KG/M2 | WEIGHT: 293 LBS

## 2020-12-11 PROCEDURE — 85027 COMPLETE CBC AUTOMATED: CPT | Performed by: SURGERY

## 2020-12-11 PROCEDURE — 90471 IMMUNIZATION ADMIN: CPT

## 2020-12-11 PROCEDURE — 80048 BASIC METABOLIC PNL TOTAL CA: CPT | Performed by: SURGERY

## 2020-12-11 RX ORDER — AMOXICILLIN AND CLAVULANATE POTASSIUM 875; 125 MG/1; MG/1
1 TABLET, FILM COATED ORAL 2 TIMES DAILY
Qty: 20 TABLET | Refills: 0 | Status: SHIPPED | OUTPATIENT
Start: 2020-12-11 | End: 2020-12-21

## 2020-12-11 RX ORDER — POTASSIUM CHLORIDE 20 MEQ/1
40 TABLET, EXTENDED RELEASE ORAL ONCE
Status: COMPLETED | OUTPATIENT
Start: 2020-12-11 | End: 2020-12-11

## 2020-12-11 RX ORDER — ACETAMINOPHEN 500 MG
1000 TABLET ORAL EVERY 8 HOURS
Qty: 60 TABLET | Refills: 0 | Status: SHIPPED | OUTPATIENT
Start: 2020-12-11 | End: 2021-11-15 | Stop reason: ALTCHOICE

## 2020-12-11 RX ORDER — OXYCODONE HYDROCHLORIDE 5 MG/1
5 CAPSULE ORAL EVERY 4 HOURS PRN
Qty: 20 CAPSULE | Refills: 0 | Status: SHIPPED | OUTPATIENT
Start: 2020-12-11 | End: 2020-12-11

## 2020-12-11 RX ORDER — OXYCODONE HYDROCHLORIDE 5 MG/1
5 TABLET ORAL EVERY 4 HOURS PRN
Qty: 20 TABLET | Refills: 0 | Status: SHIPPED | OUTPATIENT
Start: 2020-12-11 | End: 2021-11-15 | Stop reason: ALTCHOICE

## 2020-12-11 NOTE — PLAN OF CARE
A&Ox4, VSS, HR (low 90's), Lungs clear bilaterally, breathing regular/non-labored on RA. Abd is soft, non-distended. Bowel sounds active, pt reports passing gas. Denies nausea. Pt voiding ad estefani.  Midline incisicion w/ abd pad and abd binder, BAMBI drain to bu Anticipate increased pain with activity and pre-medicate as appropriate  Outcome: Progressing

## 2020-12-11 NOTE — PROGRESS NOTES
[facility]  Progress Note    Kareem Huynh Patient Status:  Inpatient    1965 MRN [id number]   [facility] 0SW-A Attending Fred Barbosa MD   Hosp Day # 3 PCP Bibiana Laguerre MD     Subjective:  Pt tolerating clears.   +Flatus an

## 2020-12-13 NOTE — PAYOR COMM NOTE
--------------  DISCHARGE REVIEW    Payor: [address] #:  [id number]  Authorization Number: [id number]    Admit date: 12/8/20  Admit time:  5502  Discharge Date: 12/11/2020  4:25 PM     Admitting Physician: Li Schmid

## 2020-12-15 NOTE — DISCHARGE SUMMARY
[facility]  Discharge Summary    Meg Corona Patient Status:  Inpatient    1965 MRN [id number]   [facility] 0SW-A Attending No att. providers found   Hosp Day # 3 PCP Yara Morales MD     Date of Admission: 2020    Date of Lexa Hilliard Exam: Abdomen soft, non-tender, good bowel sounds. Incisions clean, dry, intact, no erythema.     Consultations: none    Complications: none     Disposition: Home to self care   Discharge Condition: Good    Discharge Medications: Discharge Medication List a

## 2020-12-18 ENCOUNTER — OFFICE VISIT (OUTPATIENT)
Dept: SURGERY | Facility: CLINIC | Age: 55
End: 2020-12-18

## 2020-12-18 VITALS
BODY MASS INDEX: 45.99 KG/M2 | TEMPERATURE: 97 F | HEIGHT: 67 IN | DIASTOLIC BLOOD PRESSURE: 79 MMHG | SYSTOLIC BLOOD PRESSURE: 117 MMHG | HEART RATE: 102 BPM | WEIGHT: 293 LBS

## 2020-12-18 DIAGNOSIS — Z90.49 HISTORY OF BOWEL RESECTION: Primary | ICD-10-CM

## 2020-12-18 PROCEDURE — 3008F BODY MASS INDEX DOCD: CPT | Performed by: PHYSICIAN ASSISTANT

## 2020-12-18 PROCEDURE — 1111F DSCHRG MED/CURRENT MED MERGE: CPT | Performed by: PHYSICIAN ASSISTANT

## 2020-12-18 PROCEDURE — 3078F DIAST BP <80 MM HG: CPT | Performed by: PHYSICIAN ASSISTANT

## 2020-12-18 PROCEDURE — 99024 POSTOP FOLLOW-UP VISIT: CPT | Performed by: PHYSICIAN ASSISTANT

## 2020-12-18 PROCEDURE — 3074F SYST BP LT 130 MM HG: CPT | Performed by: PHYSICIAN ASSISTANT

## 2020-12-18 RX ORDER — OXYCODONE HYDROCHLORIDE 5 MG/1
5 TABLET ORAL EVERY 6 HOURS PRN
Qty: 15 TABLET | Refills: 0 | Status: SHIPPED | OUTPATIENT
Start: 2020-12-18 | End: 2021-11-15 | Stop reason: ALTCHOICE

## 2020-12-18 NOTE — PROGRESS NOTES
Post Operative Visit Note       Active Problems  1. History of bowel resection         Chief Complaint   Patient presents with:  Post-Op: Abdominal wound exploration SB resection, colon resection 12/7 staples.  c/o sharp intermittent pain when at rest or ac history have been reviewed by me today.     Family History   Problem Relation Age of Onset   • Thyroid disease Mother    • Hypertension Mother      Social History    Socioeconomic History      Marital status:       Spouse name: Not on file      Numb palpitations and leg swelling. Gastrointestinal: Negative for abdominal distention, abdominal pain, anal bleeding, blood in stool, constipation, diarrhea, nausea and vomiting.    Genitourinary: Negative for difficulty urinating, dysuria, frequency and urg more than 20 pounds for 6 weeks from the date of her surgery. 3. She may advance diet as tolerated. 4. She is to continue oxycodone as needed for pain. I instructed her that I will refill the oxycodone for 1 time today.   This would be her last refill of

## 2021-01-11 ENCOUNTER — OFFICE VISIT (OUTPATIENT)
Dept: SURGERY | Facility: CLINIC | Age: 56
End: 2021-01-11
Payer: COMMERCIAL

## 2021-01-11 VITALS
SYSTOLIC BLOOD PRESSURE: 141 MMHG | HEIGHT: 69 IN | WEIGHT: 290.63 LBS | BODY MASS INDEX: 43.05 KG/M2 | HEART RATE: 100 BPM | TEMPERATURE: 98 F | DIASTOLIC BLOOD PRESSURE: 100 MMHG

## 2021-01-11 DIAGNOSIS — T85.79XD INFECTED PROSTHETIC MESH OF ABDOMINAL WALL, SUBSEQUENT ENCOUNTER: Primary | ICD-10-CM

## 2021-01-11 PROCEDURE — 99024 POSTOP FOLLOW-UP VISIT: CPT | Performed by: SURGERY

## 2021-01-11 PROCEDURE — 3077F SYST BP >= 140 MM HG: CPT | Performed by: SURGERY

## 2021-01-11 PROCEDURE — 3008F BODY MASS INDEX DOCD: CPT | Performed by: SURGERY

## 2021-01-11 PROCEDURE — 3080F DIAST BP >= 90 MM HG: CPT | Performed by: SURGERY

## 2021-01-11 NOTE — PROGRESS NOTES
Post Operative Visit Note       Active Problems  1.  Infected prosthetic mesh of abdominal wall, subsequent encounter         Chief Complaint   Patient presents with:  Post-Op: 3 wk Post op - abdominal wound, colon resection - c/o sharp pain when eating or ABDOM HYSTERECTOMY     • TOTAL ABDOMINAL HYSTERECTOMY  2015    with BSO       The family history and social history have been reviewed by me today.     Family History   Problem Relation Age of Onset   • Thyroid disease Mother    • Hypertension Mother      Genette Loveless wheezing. Cardiovascular: Negative for chest pain, palpitations and leg swelling. Gastrointestinal: Negative for abdominal distention, abdominal pain, anal bleeding, blood in stool, constipation, diarrhea, nausea and vomiting.    Genitourinary: Keny Beltran diagnosis)      Plan   · I reassured the patient that she is healing well. She had a very large surgery and is expected to take at least 6 to 8 weeks to heal.  · I have encouraged her to start daily outdoor walks.   This will help increase her activity as

## 2021-02-08 ENCOUNTER — OFFICE VISIT (OUTPATIENT)
Dept: SURGERY | Facility: CLINIC | Age: 56
End: 2021-02-08

## 2021-02-08 VITALS
SYSTOLIC BLOOD PRESSURE: 147 MMHG | DIASTOLIC BLOOD PRESSURE: 91 MMHG | HEIGHT: 69 IN | HEART RATE: 98 BPM | WEIGHT: 293 LBS | TEMPERATURE: 97 F | BODY MASS INDEX: 43.4 KG/M2

## 2021-02-08 DIAGNOSIS — K43.2 INCISIONAL HERNIA, WITHOUT OBSTRUCTION OR GANGRENE: ICD-10-CM

## 2021-02-08 DIAGNOSIS — T85.79XD INFECTED PROSTHETIC MESH OF ABDOMINAL WALL, SUBSEQUENT ENCOUNTER: Primary | ICD-10-CM

## 2021-02-08 PROCEDURE — 3008F BODY MASS INDEX DOCD: CPT | Performed by: SURGERY

## 2021-02-08 PROCEDURE — 3080F DIAST BP >= 90 MM HG: CPT | Performed by: SURGERY

## 2021-02-08 PROCEDURE — 99024 POSTOP FOLLOW-UP VISIT: CPT | Performed by: SURGERY

## 2021-02-08 PROCEDURE — 3077F SYST BP >= 140 MM HG: CPT | Performed by: SURGERY

## 2021-02-08 NOTE — PROGRESS NOTES
Post Operative Visit Note       Active Problems  1. Infected prosthetic mesh of abdominal wall, subsequent encounter    2.  Incisional hernia, without obstruction or gangrene         Chief Complaint   Patient presents with:  Post-Op: 4 wk p/o Abdominal woun HISTORY      SBO 2003   • REMOVAL GALLBLADDER     • REPAIR ING HERNIA,5+Y/O,REDUCIBL     • TOTAL ABDOM HYSTERECTOMY     • TOTAL ABDOMINAL HYSTERECTOMY  2015    with BSO       The family history and social history have been reviewed by me today.     Family H throat and trouble swallowing. Respiratory: Negative for apnea, cough, shortness of breath and wheezing. Cardiovascular: Negative for chest pain, palpitations and leg swelling.    Gastrointestinal: Negative for abdominal distention, abdominal pain, an subsequent encounter  (primary encounter diagnosis)  Incisional hernia, without obstruction or gangrene      Plan   · Unfortunately, the patient does have recurrence of her ventral hernia.   With her daughter present, she and I discussed repair of her ventr

## 2021-04-17 ENCOUNTER — IMMUNIZATION (OUTPATIENT)
Dept: LAB | Age: 56
End: 2021-04-17
Attending: HOSPITALIST
Payer: COMMERCIAL

## 2021-04-17 DIAGNOSIS — Z23 NEED FOR VACCINATION: Primary | ICD-10-CM

## 2021-04-17 PROCEDURE — 0001A SARSCOV2 VAC 30MCG/0.3ML IM: CPT

## 2021-04-19 ENCOUNTER — OFFICE VISIT (OUTPATIENT)
Dept: SURGERY | Facility: CLINIC | Age: 56
End: 2021-04-19
Payer: COMMERCIAL

## 2021-04-19 VITALS
BODY MASS INDEX: 43.4 KG/M2 | SYSTOLIC BLOOD PRESSURE: 131 MMHG | DIASTOLIC BLOOD PRESSURE: 85 MMHG | HEART RATE: 101 BPM | WEIGHT: 293 LBS | TEMPERATURE: 97 F | HEIGHT: 69 IN

## 2021-04-19 DIAGNOSIS — K43.0 RECURRENT VENTRAL INCISIONAL HERNIA WITH OBSTRUCTION: Primary | ICD-10-CM

## 2021-04-19 PROCEDURE — 99214 OFFICE O/P EST MOD 30 MIN: CPT | Performed by: SURGERY

## 2021-04-19 PROCEDURE — 3079F DIAST BP 80-89 MM HG: CPT | Performed by: SURGERY

## 2021-04-19 PROCEDURE — 3075F SYST BP GE 130 - 139MM HG: CPT | Performed by: SURGERY

## 2021-04-19 PROCEDURE — 3008F BODY MASS INDEX DOCD: CPT | Performed by: SURGERY

## 2021-04-19 NOTE — PROGRESS NOTES
Post Operative Visit Note       Active Problems  1. Recurrent ventral incisional hernia with obstruction         Chief Complaint   Patient presents with:  Hernia: EP PT states hernia repair in Dec 2020 states is painful and feels a lump.   PT denies n/v fev children: Not on file      Years of education: Not on file      Highest education level: Not on file    Tobacco Use      Smoking status: Never Smoker      Smokeless tobacco: Never Used    Vaping Use      Vaping Use: Never used    Substance and Sexual Activ Musculoskeletal: Negative for arthralgias and myalgias. Skin: Negative for color change and rash. Neurological: Negative for tremors, syncope and weakness. Hematological: Negative for adenopathy. Does not bruise/bleed easily.    Psychiatric/Behavior bleeding, wound infection, and injury to the hernia contents. The patient was agreeable to proceed with the operation. · The patient will check her schedule and call to pick a surgical date once her schedule allows.   · I asked her to call with any worseni

## 2021-04-24 PROBLEM — T14.8XXA INFECTED WOUND: Status: RESOLVED | Noted: 2020-12-08 | Resolved: 2021-04-24

## 2021-04-24 PROBLEM — K43.0 RECURRENT VENTRAL INCISIONAL HERNIA WITH OBSTRUCTION: Status: ACTIVE | Noted: 2021-04-24

## 2021-04-24 PROBLEM — L08.9 INFECTED WOUND: Status: RESOLVED | Noted: 2020-12-08 | Resolved: 2021-04-24

## 2021-04-24 PROBLEM — T85.79XA INFECTED PROSTHETIC MESH OF ABDOMINAL WALL (HCC): Status: RESOLVED | Noted: 2020-12-07 | Resolved: 2021-04-24

## 2021-05-08 ENCOUNTER — IMMUNIZATION (OUTPATIENT)
Dept: LAB | Age: 56
End: 2021-05-08
Attending: HOSPITALIST
Payer: COMMERCIAL

## 2021-05-08 DIAGNOSIS — Z23 NEED FOR VACCINATION: Primary | ICD-10-CM

## 2021-05-08 PROCEDURE — 0002A SARSCOV2 VAC 30MCG/0.3ML IM: CPT

## 2021-06-11 ENCOUNTER — TELEPHONE (OUTPATIENT)
Dept: SURGERY | Facility: CLINIC | Age: 56
End: 2021-06-11

## 2021-06-11 DIAGNOSIS — K43.2 VENTRAL HERNIA, RECURRENT: Primary | ICD-10-CM

## 2021-10-21 ENCOUNTER — TELEPHONE (OUTPATIENT)
Dept: SURGERY | Facility: CLINIC | Age: 56
End: 2021-10-21

## 2021-10-21 NOTE — TELEPHONE ENCOUNTER
Shannon Farmer,  Patient came up to fill out the [facility] info release form. She states her form was faxed over yesterday. She was hoping it could be filled and and completed by tomorrow.  I explained her surgery date was not until l12/13/21 and the forms would no

## 2021-11-12 ENCOUNTER — TELEPHONE (OUTPATIENT)
Dept: SURGERY | Facility: CLINIC | Age: 56
End: 2021-11-12

## 2021-11-12 NOTE — TELEPHONE ENCOUNTER
Faxing FMLA forms to [employer] fax [phone] and patient fax [phone] - faxing failed multiple times - pt will  forms in the office on 11-15

## 2021-11-15 ENCOUNTER — OFFICE VISIT (OUTPATIENT)
Dept: SURGERY | Facility: CLINIC | Age: 56
End: 2021-11-15
Payer: COMMERCIAL

## 2021-11-15 VITALS
DIASTOLIC BLOOD PRESSURE: 97 MMHG | TEMPERATURE: 97 F | BODY MASS INDEX: 45.52 KG/M2 | SYSTOLIC BLOOD PRESSURE: 163 MMHG | WEIGHT: 290 LBS | HEART RATE: 99 BPM | HEIGHT: 67 IN

## 2021-11-15 DIAGNOSIS — K43.0 RECURRENT VENTRAL INCISIONAL HERNIA WITH OBSTRUCTION: Primary | ICD-10-CM

## 2021-11-15 PROCEDURE — 3008F BODY MASS INDEX DOCD: CPT | Performed by: SURGERY

## 2021-11-15 PROCEDURE — 3077F SYST BP >= 140 MM HG: CPT | Performed by: SURGERY

## 2021-11-15 PROCEDURE — 99213 OFFICE O/P EST LOW 20 MIN: CPT | Performed by: SURGERY

## 2021-11-15 PROCEDURE — 3080F DIAST BP >= 90 MM HG: CPT | Performed by: SURGERY

## 2021-11-15 NOTE — PROGRESS NOTES
Follow Up Visit Note       Active Problems      1.  Recurrent ventral incisional hernia with obstruction          Chief Complaint   Patient presents with:  Hernia: Pre- Op-COMPONENT SEPARATION WITH MESH ONLAY, VENTRAL HERNIA REPAIR on 12/13- Pt states herni • OTHER SURGICAL HISTORY      SBO 2003   • REMOVAL GALLBLADDER     • REPAIR ING HERNIA,5+Y/O,REDUCIBL     • TOTAL ABDOM HYSTERECTOMY     • TOTAL ABDOMINAL HYSTERECTOMY  2015    with BSO   • VENTRAL HERNIA REPAIR N/A 3/23/2015    Procedure:  HERNIA VENTRAL and rash. Neurological: Negative for tremors, syncope and weakness. Hematological: Negative for adenopathy. Does not bruise/bleed easily. Psychiatric/Behavioral: Negative for behavioral problems and sleep disturbance.         Physical Findings   BP Cory Kerns

## 2021-12-03 ENCOUNTER — TELEPHONE (OUTPATIENT)
Dept: SURGERY | Facility: CLINIC | Age: 56
End: 2021-12-03

## 2021-12-07 RX ORDER — DIPHENHYDRAMINE HCL 25 MG
25 CAPSULE ORAL EVERY 6 HOURS PRN
COMMUNITY
End: 2021-12-29

## 2021-12-11 ENCOUNTER — LAB ENCOUNTER (OUTPATIENT)
Dept: LAB | Age: 56
End: 2021-12-11
Attending: SURGERY
Payer: COMMERCIAL

## 2021-12-11 DIAGNOSIS — K43.0 RECURRENT VENTRAL INCISIONAL HERNIA WITH OBSTRUCTION: ICD-10-CM

## 2021-12-13 ENCOUNTER — HOSPITAL ENCOUNTER (INPATIENT)
Facility: HOSPITAL | Age: 56
LOS: 2 days | Discharge: HOME | DRG: 354 | End: 2021-12-15
Attending: SURGERY | Admitting: SURGERY
Payer: COMMERCIAL

## 2021-12-13 ENCOUNTER — ANESTHESIA EVENT (OUTPATIENT)
Dept: SURGERY | Facility: HOSPITAL | Age: 56
DRG: 354 | End: 2021-12-13
Payer: COMMERCIAL

## 2021-12-13 ENCOUNTER — ANESTHESIA (OUTPATIENT)
Dept: SURGERY | Facility: HOSPITAL | Age: 56
DRG: 354 | End: 2021-12-13
Payer: COMMERCIAL

## 2021-12-13 DIAGNOSIS — K43.0 RECURRENT VENTRAL INCISIONAL HERNIA WITH OBSTRUCTION: Primary | ICD-10-CM

## 2021-12-13 DIAGNOSIS — K43.2 VENTRAL HERNIA, RECURRENT: ICD-10-CM

## 2021-12-13 PROCEDURE — 49568 REPAIR INCIS HERNIA W MESH: CPT | Performed by: STUDENT IN AN ORGANIZED HEALTH CARE EDUCATION/TRAINING PROGRAM

## 2021-12-13 PROCEDURE — 3074F SYST BP LT 130 MM HG: CPT | Performed by: STUDENT IN AN ORGANIZED HEALTH CARE EDUCATION/TRAINING PROGRAM

## 2021-12-13 PROCEDURE — 49566 REPAIR RECURR INCIS HERNIA,STRANG: CPT | Performed by: STUDENT IN AN ORGANIZED HEALTH CARE EDUCATION/TRAINING PROGRAM

## 2021-12-13 PROCEDURE — 3078F DIAST BP <80 MM HG: CPT | Performed by: STUDENT IN AN ORGANIZED HEALTH CARE EDUCATION/TRAINING PROGRAM

## 2021-12-13 PROCEDURE — 0WUF0JZ SUPPLEMENT ABDOMINAL WALL WITH SYNTHETIC SUBSTITUTE, OPEN APPROACH: ICD-10-PCS | Performed by: SURGERY

## 2021-12-13 PROCEDURE — 76942 ECHO GUIDE FOR BIOPSY: CPT | Performed by: ANESTHESIOLOGY

## 2021-12-13 PROCEDURE — 3008F BODY MASS INDEX DOCD: CPT | Performed by: STUDENT IN AN ORGANIZED HEALTH CARE EDUCATION/TRAINING PROGRAM

## 2021-12-13 DEVICE — GORE SYNECOR PREPERITONEAL 15CMX20CM OVAL BIOMATERIAL
Type: IMPLANTABLE DEVICE | Site: ABDOMEN | Status: FUNCTIONAL
Brand: GORE SYNECOR PREPERITONEAL BIOMATERIAL

## 2021-12-13 RX ORDER — ACETAMINOPHEN 500 MG
1000 TABLET ORAL ONCE
COMMUNITY
End: 2021-12-15

## 2021-12-13 RX ORDER — ONDANSETRON 2 MG/ML
4 INJECTION INTRAMUSCULAR; INTRAVENOUS AS NEEDED
Status: DISCONTINUED | OUTPATIENT
Start: 2021-12-13 | End: 2021-12-13 | Stop reason: HOSPADM

## 2021-12-13 RX ORDER — PHENYLEPHRINE HCL 10 MG/ML
VIAL (ML) INJECTION AS NEEDED
Status: DISCONTINUED | OUTPATIENT
Start: 2021-12-13 | End: 2021-12-13 | Stop reason: SURG

## 2021-12-13 RX ORDER — SODIUM CHLORIDE, SODIUM LACTATE, POTASSIUM CHLORIDE, CALCIUM CHLORIDE 600; 310; 30; 20 MG/100ML; MG/100ML; MG/100ML; MG/100ML
INJECTION, SOLUTION INTRAVENOUS CONTINUOUS
Status: DISCONTINUED | OUTPATIENT
Start: 2021-12-13 | End: 2021-12-14

## 2021-12-13 RX ORDER — ACETAMINOPHEN 500 MG
1000 TABLET ORAL ONCE AS NEEDED
Status: DISCONTINUED | OUTPATIENT
Start: 2021-12-13 | End: 2021-12-13 | Stop reason: HOSPADM

## 2021-12-13 RX ORDER — ACETAMINOPHEN 500 MG
1000 TABLET ORAL ONCE
Status: DISCONTINUED | OUTPATIENT
Start: 2021-12-13 | End: 2021-12-13

## 2021-12-13 RX ORDER — DOCUSATE SODIUM 100 MG/1
100 CAPSULE, LIQUID FILLED ORAL 2 TIMES DAILY
Status: DISCONTINUED | OUTPATIENT
Start: 2021-12-13 | End: 2021-12-15

## 2021-12-13 RX ORDER — HYDROMORPHONE HYDROCHLORIDE 1 MG/ML
0.4 INJECTION, SOLUTION INTRAMUSCULAR; INTRAVENOUS; SUBCUTANEOUS EVERY 2 HOUR PRN
Status: DISCONTINUED | OUTPATIENT
Start: 2021-12-13 | End: 2021-12-15

## 2021-12-13 RX ORDER — FAMOTIDINE 10 MG/ML
20 INJECTION, SOLUTION INTRAVENOUS 2 TIMES DAILY
Status: DISCONTINUED | OUTPATIENT
Start: 2021-12-13 | End: 2021-12-15

## 2021-12-13 RX ORDER — POLYETHYLENE GLYCOL 3350 17 G/17G
17 POWDER, FOR SOLUTION ORAL DAILY PRN
Status: DISCONTINUED | OUTPATIENT
Start: 2021-12-13 | End: 2021-12-15

## 2021-12-13 RX ORDER — SODIUM CHLORIDE, SODIUM LACTATE, POTASSIUM CHLORIDE, CALCIUM CHLORIDE 600; 310; 30; 20 MG/100ML; MG/100ML; MG/100ML; MG/100ML
INJECTION, SOLUTION INTRAVENOUS CONTINUOUS
Status: DISCONTINUED | OUTPATIENT
Start: 2021-12-13 | End: 2021-12-13

## 2021-12-13 RX ORDER — HYDROMORPHONE HYDROCHLORIDE 1 MG/ML
INJECTION, SOLUTION INTRAMUSCULAR; INTRAVENOUS; SUBCUTANEOUS
Status: COMPLETED
Start: 2021-12-13 | End: 2021-12-13

## 2021-12-13 RX ORDER — ENOXAPARIN SODIUM 100 MG/ML
0.5 INJECTION SUBCUTANEOUS DAILY
Status: DISCONTINUED | OUTPATIENT
Start: 2021-12-14 | End: 2021-12-15

## 2021-12-13 RX ORDER — HYDROCODONE BITARTRATE AND ACETAMINOPHEN 5; 325 MG/1; MG/1
2 TABLET ORAL AS NEEDED
Status: DISCONTINUED | OUTPATIENT
Start: 2021-12-13 | End: 2021-12-13 | Stop reason: HOSPADM

## 2021-12-13 RX ORDER — OXYCODONE HYDROCHLORIDE 5 MG/1
5 TABLET ORAL EVERY 4 HOURS PRN
Status: DISCONTINUED | OUTPATIENT
Start: 2021-12-13 | End: 2021-12-15

## 2021-12-13 RX ORDER — GLYCOPYRROLATE 0.2 MG/ML
INJECTION, SOLUTION INTRAMUSCULAR; INTRAVENOUS AS NEEDED
Status: DISCONTINUED | OUTPATIENT
Start: 2021-12-13 | End: 2021-12-13 | Stop reason: SURG

## 2021-12-13 RX ORDER — METOCLOPRAMIDE HYDROCHLORIDE 5 MG/ML
10 INJECTION INTRAMUSCULAR; INTRAVENOUS AS NEEDED
Status: DISCONTINUED | OUTPATIENT
Start: 2021-12-13 | End: 2021-12-13 | Stop reason: HOSPADM

## 2021-12-13 RX ORDER — NEOSTIGMINE METHYLSULFATE 1 MG/ML
INJECTION INTRAVENOUS AS NEEDED
Status: DISCONTINUED | OUTPATIENT
Start: 2021-12-13 | End: 2021-12-13 | Stop reason: SURG

## 2021-12-13 RX ORDER — LABETALOL HYDROCHLORIDE 5 MG/ML
INJECTION, SOLUTION INTRAVENOUS AS NEEDED
Status: DISCONTINUED | OUTPATIENT
Start: 2021-12-13 | End: 2021-12-13 | Stop reason: SURG

## 2021-12-13 RX ORDER — OXYCODONE HYDROCHLORIDE 10 MG/1
10 TABLET ORAL EVERY 4 HOURS PRN
Status: DISCONTINUED | OUTPATIENT
Start: 2021-12-13 | End: 2021-12-15

## 2021-12-13 RX ORDER — HYDROMORPHONE HYDROCHLORIDE 1 MG/ML
0.8 INJECTION, SOLUTION INTRAMUSCULAR; INTRAVENOUS; SUBCUTANEOUS EVERY 2 HOUR PRN
Status: DISCONTINUED | OUTPATIENT
Start: 2021-12-13 | End: 2021-12-15

## 2021-12-13 RX ORDER — HEPARIN SODIUM 5000 [USP'U]/ML
INJECTION, SOLUTION INTRAVENOUS; SUBCUTANEOUS
Status: DISPENSED
Start: 2021-12-13 | End: 2021-12-13

## 2021-12-13 RX ORDER — MAGNESIUM OXIDE 400 MG (241.3 MG MAGNESIUM) TABLET
400 TABLET DAILY
Status: DISCONTINUED | OUTPATIENT
Start: 2021-12-13 | End: 2021-12-15

## 2021-12-13 RX ORDER — ONDANSETRON 2 MG/ML
INJECTION INTRAMUSCULAR; INTRAVENOUS AS NEEDED
Status: DISCONTINUED | OUTPATIENT
Start: 2021-12-13 | End: 2021-12-13 | Stop reason: SURG

## 2021-12-13 RX ORDER — KETOROLAC TROMETHAMINE 30 MG/ML
INJECTION, SOLUTION INTRAMUSCULAR; INTRAVENOUS AS NEEDED
Status: DISCONTINUED | OUTPATIENT
Start: 2021-12-13 | End: 2021-12-13 | Stop reason: SURG

## 2021-12-13 RX ORDER — LIDOCAINE HYDROCHLORIDE 10 MG/ML
INJECTION, SOLUTION EPIDURAL; INFILTRATION; INTRACAUDAL; PERINEURAL AS NEEDED
Status: DISCONTINUED | OUTPATIENT
Start: 2021-12-13 | End: 2021-12-13 | Stop reason: SURG

## 2021-12-13 RX ORDER — HYDROMORPHONE HYDROCHLORIDE 1 MG/ML
0.4 INJECTION, SOLUTION INTRAMUSCULAR; INTRAVENOUS; SUBCUTANEOUS EVERY 5 MIN PRN
Status: DISCONTINUED | OUTPATIENT
Start: 2021-12-13 | End: 2021-12-13 | Stop reason: HOSPADM

## 2021-12-13 RX ORDER — DEXAMETHASONE SODIUM PHOSPHATE 4 MG/ML
VIAL (ML) INJECTION AS NEEDED
Status: DISCONTINUED | OUTPATIENT
Start: 2021-12-13 | End: 2021-12-13 | Stop reason: SURG

## 2021-12-13 RX ORDER — SODIUM CHLORIDE, SODIUM LACTATE, POTASSIUM CHLORIDE, CALCIUM CHLORIDE 600; 310; 30; 20 MG/100ML; MG/100ML; MG/100ML; MG/100ML
INJECTION, SOLUTION INTRAVENOUS CONTINUOUS
Status: DISCONTINUED | OUTPATIENT
Start: 2021-12-13 | End: 2021-12-13 | Stop reason: HOSPADM

## 2021-12-13 RX ORDER — KETAMINE HYDROCHLORIDE 50 MG/ML
INJECTION, SOLUTION, CONCENTRATE INTRAMUSCULAR; INTRAVENOUS AS NEEDED
Status: DISCONTINUED | OUTPATIENT
Start: 2021-12-13 | End: 2021-12-13 | Stop reason: SURG

## 2021-12-13 RX ORDER — ACETAMINOPHEN 500 MG
1000 TABLET ORAL EVERY 8 HOURS SCHEDULED
Status: DISCONTINUED | OUTPATIENT
Start: 2021-12-13 | End: 2021-12-15

## 2021-12-13 RX ORDER — HYDROCODONE BITARTRATE AND ACETAMINOPHEN 5; 325 MG/1; MG/1
1 TABLET ORAL AS NEEDED
Status: DISCONTINUED | OUTPATIENT
Start: 2021-12-13 | End: 2021-12-13 | Stop reason: HOSPADM

## 2021-12-13 RX ORDER — ONDANSETRON 2 MG/ML
4 INJECTION INTRAMUSCULAR; INTRAVENOUS EVERY 4 HOURS PRN
Status: DISCONTINUED | OUTPATIENT
Start: 2021-12-13 | End: 2021-12-15

## 2021-12-13 RX ORDER — ROCURONIUM BROMIDE 10 MG/ML
INJECTION, SOLUTION INTRAVENOUS AS NEEDED
Status: DISCONTINUED | OUTPATIENT
Start: 2021-12-13 | End: 2021-12-13 | Stop reason: SURG

## 2021-12-13 RX ORDER — FAMOTIDINE 20 MG/1
20 TABLET ORAL 2 TIMES DAILY
Status: DISCONTINUED | OUTPATIENT
Start: 2021-12-13 | End: 2021-12-15

## 2021-12-13 RX ORDER — HEPARIN SODIUM 5000 [USP'U]/ML
5000 INJECTION, SOLUTION INTRAVENOUS; SUBCUTANEOUS ONCE
Status: COMPLETED | OUTPATIENT
Start: 2021-12-13 | End: 2021-12-13

## 2021-12-13 RX ADMIN — KETOROLAC TROMETHAMINE 30 MG: 30 INJECTION, SOLUTION INTRAMUSCULAR; INTRAVENOUS at 09:55:00

## 2021-12-13 RX ADMIN — DEXAMETHASONE SODIUM PHOSPHATE 4 MG: 4 MG/ML VIAL (ML) INJECTION at 08:08:00

## 2021-12-13 RX ADMIN — SODIUM CHLORIDE, SODIUM LACTATE, POTASSIUM CHLORIDE, CALCIUM CHLORIDE: 600; 310; 30; 20 INJECTION, SOLUTION INTRAVENOUS at 09:55:00

## 2021-12-13 RX ADMIN — ONDANSETRON 4 MG: 2 INJECTION INTRAMUSCULAR; INTRAVENOUS at 09:45:00

## 2021-12-13 RX ADMIN — PHENYLEPHRINE HCL 200 MCG: 10 MG/ML VIAL (ML) INJECTION at 10:10:00

## 2021-12-13 RX ADMIN — PHENYLEPHRINE HCL 200 MCG: 10 MG/ML VIAL (ML) INJECTION at 09:41:00

## 2021-12-13 RX ADMIN — SODIUM CHLORIDE, SODIUM LACTATE, POTASSIUM CHLORIDE, CALCIUM CHLORIDE: 600; 310; 30; 20 INJECTION, SOLUTION INTRAVENOUS at 10:20:00

## 2021-12-13 RX ADMIN — ROCURONIUM BROMIDE 10 MG: 10 INJECTION, SOLUTION INTRAVENOUS at 09:41:00

## 2021-12-13 RX ADMIN — LABETALOL HYDROCHLORIDE 7.5 MG: 5 INJECTION, SOLUTION INTRAVENOUS at 07:40:00

## 2021-12-13 RX ADMIN — PHENYLEPHRINE HCL 200 MCG: 10 MG/ML VIAL (ML) INJECTION at 08:17:00

## 2021-12-13 RX ADMIN — LABETALOL HYDROCHLORIDE 5 MG: 5 INJECTION, SOLUTION INTRAVENOUS at 08:42:00

## 2021-12-13 RX ADMIN — SODIUM CHLORIDE, SODIUM LACTATE, POTASSIUM CHLORIDE, CALCIUM CHLORIDE: 600; 310; 30; 20 INJECTION, SOLUTION INTRAVENOUS at 08:32:00

## 2021-12-13 RX ADMIN — PHENYLEPHRINE HCL 200 MCG: 10 MG/ML VIAL (ML) INJECTION at 09:12:00

## 2021-12-13 RX ADMIN — SODIUM CHLORIDE, SODIUM LACTATE, POTASSIUM CHLORIDE, CALCIUM CHLORIDE: 600; 310; 30; 20 INJECTION, SOLUTION INTRAVENOUS at 10:51:00

## 2021-12-13 RX ADMIN — PHENYLEPHRINE HCL 200 MCG: 10 MG/ML VIAL (ML) INJECTION at 08:22:00

## 2021-12-13 RX ADMIN — LIDOCAINE HYDROCHLORIDE 50 MG: 10 INJECTION, SOLUTION EPIDURAL; INFILTRATION; INTRACAUDAL; PERINEURAL at 07:35:00

## 2021-12-13 RX ADMIN — PHENYLEPHRINE HCL 200 MCG: 10 MG/ML VIAL (ML) INJECTION at 10:20:00

## 2021-12-13 RX ADMIN — PHENYLEPHRINE HCL 300 MCG: 10 MG/ML VIAL (ML) INJECTION at 08:13:00

## 2021-12-13 RX ADMIN — PHENYLEPHRINE HCL 200 MCG: 10 MG/ML VIAL (ML) INJECTION at 08:48:00

## 2021-12-13 RX ADMIN — KETAMINE HYDROCHLORIDE 10 MG: 50 INJECTION, SOLUTION, CONCENTRATE INTRAMUSCULAR; INTRAVENOUS at 07:40:00

## 2021-12-13 RX ADMIN — GLYCOPYRROLATE 0.4 MG: 0.2 INJECTION, SOLUTION INTRAMUSCULAR; INTRAVENOUS at 10:14:00

## 2021-12-13 RX ADMIN — PHENYLEPHRINE HCL 200 MCG: 10 MG/ML VIAL (ML) INJECTION at 09:36:00

## 2021-12-13 RX ADMIN — NEOSTIGMINE METHYLSULFATE 2 MG: 1 INJECTION INTRAVENOUS at 10:14:00

## 2021-12-13 RX ADMIN — PHENYLEPHRINE HCL 200 MCG: 10 MG/ML VIAL (ML) INJECTION at 09:06:00

## 2021-12-13 RX ADMIN — PHENYLEPHRINE HCL 200 MCG: 10 MG/ML VIAL (ML) INJECTION at 09:00:00

## 2021-12-13 RX ADMIN — ROCURONIUM BROMIDE 50 MG: 10 INJECTION, SOLUTION INTRAVENOUS at 07:37:00

## 2021-12-13 NOTE — PLAN OF CARE
Pt A&Ox4. RA. Pt denies any WILLAM, CP or calf pain at this time. Midline Incision covered with ABD pad and abd binder. Yazan x1 with bloody  output. PRN pain meds given. Pt tolerating diet, denies any N/V. IVF per order.  Gardner in place draining clear yellow urin Problem: DISCHARGE PLANNING  Goal: Discharge to home or other facility with appropriate resources  Description: INTERVENTIONS:  - Identify barriers to discharge w/pt and caregiver  - Include patient/family/discharge partner in discharge Triston Pryor

## 2021-12-13 NOTE — ANESTHESIA PREPROCEDURE EVALUATION
PRE-OP EVALUATION    Patient Name: Dee Jules    Admit Diagnosis: Ventral hernia, recurrent [K43.2]    Pre-op Diagnosis: Ventral hernia, recurrent [K43.2]    COMPONENT SEPARATION WITH MESH ONLAY, VENTRAL HERNIA REPAIR    Anesthesia Procedure: COMPONENT S Past Surgical History:   Procedure Laterality Date   • COLON SURGERY     • HERNIA SURGERY      3 times   • OTHER SURGICAL HISTORY      gastro bypass 2002   • OTHER SURGICAL HISTORY      SBO 2003   • REMOVAL GALLBLADDER     • REPAIR visceral pain is not covered and upper abdominal wall pain missed with lateral TAP block. Blood pressure is elevated today but not severe enough to postpone surgery. Labetalol was used for surgery last year and I will anticipate use again today.     My inte

## 2021-12-13 NOTE — H&P
History & Physical Examination    Patient Name: Enriqueta Morales  MRN: [id number]  CSN: [id number]  YOB: 1965    Diagnosis: Recurrent ventral incisional hernia    Present Illness: Jenny Lange is a 81-year-old female who presents to clinic today for HYSTERECTOMY  2015    with BSO   • VENTRAL HERNIA REPAIR N/A 3/23/2015    Procedure:  HERNIA VENTRAL REPAIR;  Surgeon: Myrna Da Silva MD;  Location:  MAIN OR     Family History   Problem Relation Age of Onset   • Thyroid disease Mother    • Hyperten

## 2021-12-13 NOTE — PROGRESS NOTES
12/13/21 1400   Clinical Encounter Type   Visited With Patient   Routine Visit   (Responded to consult for   visit)   Continue Visiting   (Kit Warren to call  anytime through her nurse)   Patient's Supportive Strategies/Resources Id

## 2021-12-13 NOTE — PROGRESS NOTES
[facility] Note:  Anticoagulation Weight Dose Adjustment for enoxaparin    Karine Haro is a 49year old patient who has been prescribed enoxaparin 40 mg every 24 hours.       CrCl cannot be calculated (Patient's most recent lab result is older than the m

## 2021-12-13 NOTE — ANESTHESIA POSTPROCEDURE EVALUATION
[address] Patient Status:  Inpatient   Age/Gender 49year old female MRN [id number]   Location [address] [facility] Attending Fred Barbosa MD   Hosp Day # 0 PCP Bibiana Laguerre MD       Anesthesia Post-op Note

## 2021-12-13 NOTE — OPERATIVE REPORT
[facility]  Op Note    [email or web address]   CSN [id number] MRN [id number]   Admission Date 12/13/2021 Operation Date 12/13/2021   Attending Physician Tera Gómez MD Operating Physician Bertin Walsh MD     DATE OF OPERATION:  12/13 Cautery was used to obtain hemostasis and then to resect the overlying scar. Immediately, the hernia sac and contents were encountered. The fascial edges were identified, and an extensive lysis of adhesions was undertaken.   Metzenbaums and cautery were u condition.     Montserrat Rucker MD

## 2021-12-14 NOTE — PLAN OF CARE
Problem: Patient/Family Goals  Goal: Patient/Family Long Term Goal  Description: Patient's Long Term Goal:go home    Interventions:  - pass gas  - See additional Care Plan goals for specific interventions  12/13/2021 2233 by José Miguel Mckeon RN  Outcome: Assess pt frequently for physical needs  - Identify cognitive and physical deficits and behaviors that affect risk of falls.   - [place]well fall precautions as indicated by assessment.  - Educate pt/family on patient safety including physical limitations  - attentively, be patient, do not interrupt  - Minimize distractions  - Allow time for understanding and response  - Establish method for patient to ask for assistance (call light)  - Provide an  as needed  - Communicate barriers and strategies to

## 2021-12-14 NOTE — PROGRESS NOTES
[facility]  Progress Note    Scot Hof Patient Status:  Inpatient    1965 MRN [id number]   [facility] Attending Natalee Kauffman MD   Hosp Day # 1 PCP Joselito Romero MD     Subjective:  Pt resting comfortably in bed.   Ann Blakely

## 2021-12-14 NOTE — PLAN OF CARE
Patient is alert and oriented. On room air. Abdomen is soft/ rounded. Midline incision with ABD pad and paper tape- c/d/I. Wearing abdominal binder. BAMBI drain x1 with bloody output. Patient denies passing gas. Patient denies nausea. Tolerating clears.  Patie INTERVENTIONS:  - Assess pt frequently for physical needs  - Identify cognitive and physical deficits and behaviors that affect risk of falls.   - [place] fall precautions as indicated by assessment.  - Educate pt/family on patient safety including physic

## 2021-12-14 NOTE — PLAN OF CARE
Patient vitals stable. A&O x4. Patient stated having pain which was relieved by medication. Patient tolerating diet well. Patient denied chest pain, shortness of breath, and calf pain. ERAS protocol in place.  Midline incision of abdomen covered with dressi

## 2021-12-15 VITALS
TEMPERATURE: 98 F | BODY MASS INDEX: 42.63 KG/M2 | HEIGHT: 67 IN | OXYGEN SATURATION: 98 % | WEIGHT: 271.63 LBS | RESPIRATION RATE: 18 BRPM | HEART RATE: 115 BPM | DIASTOLIC BLOOD PRESSURE: 47 MMHG | SYSTOLIC BLOOD PRESSURE: 103 MMHG

## 2021-12-15 RX ORDER — CEPHALEXIN 500 MG/1
500 CAPSULE ORAL 2 TIMES DAILY
Qty: 14 CAPSULE | Refills: 0 | Status: SHIPPED | OUTPATIENT
Start: 2021-12-15 | End: 2021-12-22

## 2021-12-15 RX ORDER — HYDROCODONE BITARTRATE AND ACETAMINOPHEN 5; 325 MG/1; MG/1
1 TABLET ORAL EVERY 4 HOURS PRN
Qty: 15 TABLET | Refills: 0 | Status: SHIPPED | OUTPATIENT
Start: 2021-12-15 | End: 2021-12-23

## 2021-12-15 RX ORDER — CEFAZOLIN SODIUM/WATER 2 G/20 ML
2 SYRINGE (ML) INTRAVENOUS EVERY 8 HOURS
Status: DISCONTINUED | OUTPATIENT
Start: 2021-12-15 | End: 2021-12-15

## 2021-12-15 NOTE — CONSULTS
[facility] Note: Antimicrobial Weight Based Dose Adjustment for: cefazolin (ANCEF)    Cain Li Gutierrez is a 49year old patient who has been prescribed cefazolin (ANCEF) 1g every 8 hours. Estimated Creatinine Clearance: 58.4 mL/min (A) (based on SCr of 1.

## 2021-12-15 NOTE — DISCHARGE SUMMARY
[facility]  Discharge Summary    [facility] Patient Status:  Inpatient    1965 MRN [id number]   [facility] Attending No att. providers found   Hosp Day # 2 PCP Anabela Mondragon MD     Date of Admission: 2021    Date of D as of 12/15/2021  9:19 AM    START taking these medications    HYDROcodone-acetaminophen 5-325 MG Oral Tab  Take 1 tablet by mouth every 4 (four) hours as needed for Pain., Normal, Disp-15 tablet, R-0    cephalexin (KEFLEX) 500 MG Oral Cap  Take 1 capsule

## 2021-12-15 NOTE — PLAN OF CARE
Problem: Patient/Family Goals  Goal: Patient/Family Long Term Goal  Description: Patient's Long Term Goal:go home    Interventions:  - pass gas  - See additional Care Plan goals for specific interventions  Outcome: Progressing  Goal: Patient/Family Short appropriate  - Consider OT/PT consult to assist with strengthening/mobility  - Encourage toileting schedule  Outcome: Progressing     Problem: DISCHARGE PLANNING  Goal: Discharge to home or other facility with appropriate resources  Description: INTERVENTI

## 2021-12-15 NOTE — PLAN OF CARE
Patient vitals stable. A&O x4. Patient stated having pain which is relieved by medication. Patient able to ambulate safely. Tolerating diet well. ERAS protocol in place. Midline abdominal incision with dressing noted; clean, dry, intact.  BAMBI drain noted to

## 2021-12-15 NOTE — PROGRESS NOTES
Patient discharged to home. Discharge instructions reviewed with the patient, and the patient verbalized her understanding of her discharge instructions.  Dewey-Wilde Drain discharge instructions given to the patient, and the patient stated that she knows

## 2021-12-15 NOTE — PROGRESS NOTES
[facility]  Progress Note    Martin Maldonado Patient Status:  Inpatient    1965 MRN [id number]   [facility] Attending Amisha Pillai MD   Hosp Day # 2 PCP Alysha Lombardo MD     Subjective:   The patient is resting comfortabl SHAHLA  12/15/2021  8:12 AM

## 2021-12-20 ENCOUNTER — TELEPHONE (OUTPATIENT)
Dept: SURGERY | Facility: CLINIC | Age: 56
End: 2021-12-20

## 2021-12-20 RX ORDER — HYDROCODONE BITARTRATE AND ACETAMINOPHEN 5; 325 MG/1; MG/1
1 TABLET ORAL EVERY 6 HOURS PRN
Qty: 20 TABLET | Refills: 0 | Status: SHIPPED | OUTPATIENT
Start: 2021-12-20 | End: 2021-12-29

## 2021-12-20 NOTE — TELEPHONE ENCOUNTER
12/13 Incarcerated recurrent ventral hernia, requesting refill pain med 7/10 in morning and pm, is alternating with ibuprofen

## 2021-12-23 ENCOUNTER — OFFICE VISIT (OUTPATIENT)
Dept: SURGERY | Facility: CLINIC | Age: 56
End: 2021-12-23

## 2021-12-23 VITALS — BODY MASS INDEX: 45.99 KG/M2 | WEIGHT: 293 LBS | TEMPERATURE: 97 F | HEIGHT: 67 IN

## 2021-12-23 DIAGNOSIS — Z98.890 POST-OPERATIVE STATE: Primary | ICD-10-CM

## 2021-12-23 PROCEDURE — 99024 POSTOP FOLLOW-UP VISIT: CPT | Performed by: STUDENT IN AN ORGANIZED HEALTH CARE EDUCATION/TRAINING PROGRAM

## 2021-12-23 PROCEDURE — 3008F BODY MASS INDEX DOCD: CPT | Performed by: STUDENT IN AN ORGANIZED HEALTH CARE EDUCATION/TRAINING PROGRAM

## 2021-12-24 RX ORDER — HYDROCODONE BITARTRATE AND ACETAMINOPHEN 5; 325 MG/1; MG/1
1 TABLET ORAL EVERY 6 HOURS PRN
Qty: 15 TABLET | Refills: 0 | Status: SHIPPED | OUTPATIENT
Start: 2021-12-24

## 2021-12-28 NOTE — PROGRESS NOTES
Post Operative Visit Note       Active Problems  1.  Post-operative state         Chief Complaint   Patient presents with:  Post-Op: p/o Repair of incarcerated recurrent ventral incisional hernia with component separation technique and mesh onlay, lysis o today.     Family History   Problem Relation Age of Onset   • Thyroid disease Mother    • Hypertension Mother      Social History    Socioeconomic History      Marital status:     Tobacco Use      Smoking status: Never Smoker      Smokeless tobacco: POD# 9  from repair of incarcerated recurrent ventral incisional hernia with component separation and mesh onlay with Dr. Tunde Jean. • I explained to the patient that her hernia repair included a component separation.   The patient has not previously had a

## 2021-12-29 ENCOUNTER — OFFICE VISIT (OUTPATIENT)
Dept: SURGERY | Facility: CLINIC | Age: 56
End: 2021-12-29

## 2021-12-29 VITALS — BODY MASS INDEX: 45.99 KG/M2 | WEIGHT: 293 LBS | HEIGHT: 67 IN

## 2021-12-29 DIAGNOSIS — Z87.19 STATUS POST REPAIR OF RECURRENT VENTRAL HERNIA: Primary | ICD-10-CM

## 2021-12-29 DIAGNOSIS — Z98.890 STATUS POST REPAIR OF RECURRENT VENTRAL HERNIA: Primary | ICD-10-CM

## 2021-12-29 PROBLEM — K43.0 RECURRENT VENTRAL INCISIONAL HERNIA WITH OBSTRUCTION: Status: RESOLVED | Noted: 2021-04-24 | Resolved: 2021-12-29

## 2021-12-29 PROCEDURE — 3008F BODY MASS INDEX DOCD: CPT | Performed by: SURGERY

## 2021-12-29 PROCEDURE — 99024 POSTOP FOLLOW-UP VISIT: CPT | Performed by: SURGERY

## 2021-12-29 NOTE — PROGRESS NOTES
Post Operative Visit Note       Active Problems  1.  Status post repair of recurrent ventral hernia         Chief Complaint   Patient presents with:  Post-Op: 12/13 hernia repair- discuss drain \"still draining a lot\" pt c/o numbness in abdominal area-surg status: Never Smoker      Smokeless tobacco: Never Used    Vaping Use      Vaping Use: Never used    Substance and Sexual Activity      Alcohol use: Not Currently        Comment: 1 glass of wine 2-3 x a month      Drug use: Never      Sexual activity: Yes Cardiovascular:      Rate and Rhythm: Normal rate and regular rhythm. Pulmonary:      Effort: Pulmonary effort is normal.      Breath sounds: Normal breath sounds. Abdominal:      General: Bowel sounds are normal. There is no distension.       Oddis Downy

## 2022-01-05 ENCOUNTER — OFFICE VISIT (OUTPATIENT)
Dept: SURGERY | Facility: CLINIC | Age: 57
End: 2022-01-05

## 2022-01-05 DIAGNOSIS — Z98.890 POST-OPERATIVE STATE: Primary | ICD-10-CM

## 2022-01-05 PROCEDURE — 99024 POSTOP FOLLOW-UP VISIT: CPT | Performed by: PHYSICIAN ASSISTANT

## 2022-01-05 NOTE — PROGRESS NOTES
Post Operative Visit Note       Active Problems  1.  Post-operative state         Chief Complaint   Problems with abdominal drain       History of Present Illness   49year old female who presents today for postoperative visit following following repair o Currently        Comment: 1 glass of wine 2-3 x a month      Drug use: Never      Sexual activity: Yes        Partners: Male        Birth control/protection: Hysterectomy    Other Topics      Concerns:        Caffeine Concern: Yes          2 cups x day

## 2022-01-11 ENCOUNTER — OFFICE VISIT (OUTPATIENT)
Dept: SURGERY | Facility: CLINIC | Age: 57
End: 2022-01-11

## 2022-01-11 VITALS — HEIGHT: 67 IN | BODY MASS INDEX: 45.99 KG/M2 | TEMPERATURE: 97 F | WEIGHT: 293 LBS

## 2022-01-11 DIAGNOSIS — Z98.890 POST-OPERATIVE STATE: Primary | ICD-10-CM

## 2022-01-11 PROCEDURE — 99024 POSTOP FOLLOW-UP VISIT: CPT | Performed by: PHYSICIAN ASSISTANT

## 2022-01-11 PROCEDURE — 3008F BODY MASS INDEX DOCD: CPT | Performed by: PHYSICIAN ASSISTANT

## 2022-01-11 NOTE — PROGRESS NOTES
Post Operative Visit Note       Active Problems  1.  Post-operative state         Chief Complaint   Patient presents with:  Post-Op: PO 12/13 hernia-  slight discomfort ,no fevers,or drainage          History of Present Illness   49year old female who pr Substance and Sexual Activity      Alcohol use: Not Currently        Comment: 1 glass of wine 2-3 x a month      Drug use: Never      Sexual activity: Yes        Partners: Male        Birth control/protection: Hysterectomy    Other Topics      Concerns:

## 2022-01-25 ENCOUNTER — TELEPHONE (OUTPATIENT)
Dept: SURGERY | Facility: CLINIC | Age: 57
End: 2022-01-25

## 2022-02-01 ENCOUNTER — OFFICE VISIT (OUTPATIENT)
Dept: SURGERY | Facility: CLINIC | Age: 57
End: 2022-02-01

## 2022-02-01 VITALS
SYSTOLIC BLOOD PRESSURE: 138 MMHG | HEART RATE: 109 BPM | BODY MASS INDEX: 45.99 KG/M2 | DIASTOLIC BLOOD PRESSURE: 92 MMHG | HEIGHT: 67 IN | TEMPERATURE: 98 F | WEIGHT: 293 LBS

## 2022-02-01 DIAGNOSIS — Z98.890 POST-OPERATIVE STATE: Primary | ICD-10-CM

## 2022-02-01 PROCEDURE — 3080F DIAST BP >= 90 MM HG: CPT

## 2022-02-01 PROCEDURE — 3008F BODY MASS INDEX DOCD: CPT

## 2022-02-01 PROCEDURE — 99024 POSTOP FOLLOW-UP VISIT: CPT

## 2022-02-01 PROCEDURE — 3075F SYST BP GE 130 - 139MM HG: CPT

## 2022-12-12 ENCOUNTER — OFFICE VISIT (OUTPATIENT)
Dept: FAMILY MEDICINE CLINIC | Facility: CLINIC | Age: 57
End: 2022-12-12
Payer: COMMERCIAL

## 2022-12-12 VITALS
DIASTOLIC BLOOD PRESSURE: 83 MMHG | OXYGEN SATURATION: 98 % | RESPIRATION RATE: 20 BRPM | HEART RATE: 98 BPM | SYSTOLIC BLOOD PRESSURE: 155 MMHG

## 2022-12-12 DIAGNOSIS — J06.9 VIRAL URI WITH COUGH: Primary | ICD-10-CM

## 2022-12-12 DIAGNOSIS — R03.0 ELEVATED BLOOD PRESSURE READING: ICD-10-CM

## 2022-12-13 LAB — SARS-COV-2 RNA RESP QL NAA+PROBE: NOT DETECTED

## 2023-11-21 ENCOUNTER — TELEPHONE (OUTPATIENT)
Facility: CLINIC | Age: 58
End: 2023-11-21

## 2024-06-19 ENCOUNTER — OFFICE VISIT (OUTPATIENT)
Dept: OBGYN CLINIC | Facility: CLINIC | Age: 59
End: 2024-06-19

## 2024-06-19 VITALS
DIASTOLIC BLOOD PRESSURE: 93 MMHG | BODY MASS INDEX: 45.67 KG/M2 | HEIGHT: 67 IN | SYSTOLIC BLOOD PRESSURE: 146 MMHG | WEIGHT: 291 LBS | HEART RATE: 114 BPM

## 2024-06-19 DIAGNOSIS — Z12.31 BREAST CANCER SCREENING BY MAMMOGRAM: ICD-10-CM

## 2024-06-19 DIAGNOSIS — Z01.419 ENCOUNTER FOR ANNUAL ROUTINE GYNECOLOGICAL EXAMINATION: Primary | ICD-10-CM

## 2024-06-19 PROCEDURE — 99386 PREV VISIT NEW AGE 40-64: CPT | Performed by: STUDENT IN AN ORGANIZED HEALTH CARE EDUCATION/TRAINING PROGRAM

## 2024-06-19 NOTE — PROGRESS NOTES
[facility]  Obstetrics and Gynecology   History & Physical    Chief complaint:   Chief Complaint   Patient presents with    New Patient     Establish care     Wellness Visit     Unsure of last pap smear, thinks it was  before her hysterectomy. Needs mammogram order.        HPI: Joanna George is a 58 year old  with Patient's last menstrual period was 2015 (exact date).      Here for annual GYN exam, [facility]  Hasn't seen a doctor in a while, was a bit traumatized by all the complications after her hernia repair surgery  Pt had ULYSSES- BSO in  for fibroids along with ventral hernia repair by Dr Gold. 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 Ended up getting  because of this,  left  Pt was unaware that her surgical menopause could have contributed  No current partner, no real interest, feels she is doing fine  No issues with prolapse or incontinence    Never had any abnormal paps prior to hyst, no prior procedures on cervix    Hx Prior Abnormal Pap: No    PMHx/Surghx reviewed, below. Reports no known hx HTN. Hasn't seen PCP in a while  Famhx: no family hx breast/ovarian/colon/uterine cancers. Mom had stomach cancer    PCP: Christine Montejo MD    Review of Systems:  Constitutional:  Denies fatigue, night sweats, hot flashes  Eyes:  denies blurred or double vision  Cardiovascular:  denies chest pain or palpitations  Respiratory:  denies shortness of breath  Gastrointestinal:  denies heartburn, abdominal pain, diarrhea or constipation  Genitourinary:  denies dysuria, incontinence, abnormal vaginal discharge, vaginal itching  Musculoskeletal:  denies back pain.  Skin/Breast:  Denies any breast pain, lumps, or discharge.    Neurological:  denies headaches, extremity weakness or numbness.  Psychiatric: denies depression or anxiety.  Endocrine:   denies excessive thirst or urination.  Heme/Lymph:  +history of anemia    OB History:  OB History    Para Term  AB Living   4 1   1 3 1   SAB IAB Ectopic Multiple Live Births           1      # Outcome Date GA Lbr Jose/2nd Weight Sex Type Anes PTL Lv   4  98 33w0d  5 lb 5 oz (2.41 kg) F Vag-Spont  Y SOCORRO   3 AB            2 AB            1 AB                Meds:  Current Outpatient Medications on File Prior to Visit   Medication Sig Dispense Refill    HYDROcodone-acetaminophen 5-325 MG Oral Tab Take 1 tablet by mouth every 6 (six) hours as needed for Pain. 15 tablet 0     No current facility-administered medications on file prior to visit.       All:  No Known Allergies    PMH:  Past Medical History:    Visual impairment    for distance       PSH:  Past Surgical History:   Procedure Laterality Date    Colon surgery      Hernia surgery      3 times    Hernia surgery      Hernia surgery  2021    COMPONENT SEPARATION WITH MESH ONLAY, VENTRAL HERNIA REPAIR    Hysterectomy      Other surgical history      gastro bypass 2002    Other surgical history      SBO 2003    Removal gallbladder      Repair ing hernia,5+y/o,reducibl      Total abdom hysterectomy      Total abdominal hysterectomy      with BSO    Ventral hernia repair N/A 2015    Procedure: HERNIA VENTRAL REPAIR;  Surgeon: Daphne Gold MD;  Location:  MAIN OR       Social History:  Social History     Socioeconomic History    Marital status:      Spouse name: Not on file    Number of children: Not on file    Years of education: Not on file    Highest education level: Not on file   Occupational History    Not on file   Tobacco Use    Smoking status: Never    Smokeless tobacco: Never   Vaping Use    Vaping status: Never Used   Substance and Sexual Activity    Alcohol use: Not  Currently     Comment: 1 glass of wine 2-3 x a month    Drug use: Never    Sexual activity: Yes     Partners: Male     Birth control/protection: Hysterectomy   Other Topics Concern    Caffeine Concern Yes     Comment: 2 cups x day     Exercise Not Asked    Seat Belt Not Asked    Special Diet Yes     Comment: low amor/ low sugar    Stress Concern Not Asked    Weight Concern Not Asked   Social History Narrative    Not on file     Social Determinants of Health     Financial Resource Strain: Not on file   Food Insecurity: Not on file   Transportation Needs: Not on file   Physical Activity: Not on file   Stress: Not on file   Social Connections: Not on file   Housing Stability: Not on file        Family History:  Family History   Problem Relation Age of Onset    Thyroid disease Mother     Hypertension Mother        PHYSICAL EXAM:     Vitals:    24 1429   BP: (!) 146/93   Pulse: 114   Weight: 291 lb 0.1 oz (132 kg)   Height: 67\"       Body mass index is 45.58 kg/m².      Constitutional: well developed, well nourished  Head/Face: normocephalic  Breast: normal without palpable masses, tenderness, asymmetry, nipple discharge, nipple retraction or skin changes  Abdomen:  soft, nontender, nondistended  Skin/Hair: no unusual rashes or bruises  Extremities: no edema, no cyanosis  Psychiatric:  Oriented to time, place, person and situation. Appropriate mood and affect    Pelvic Exam:  External Genitalia: normal appearance, hair distribution, and no lesions  Urethral Meatus:  normal in size, location, without lesions and prolapse  Bladder:  No fullness, masses or tenderness  Vagina:  Normal appearance without lesions, no abnormal discharge  Cervix:  absent  Uterus: absent  Adnexa: normal without masses or tenderness  Perineum: normal  Anus: no hemorrhoids     Assessment & Plan:     Joanna George is a 58 year old      Diagnoses and all orders for this visit:    Encounter for annual routine gynecological  examination    Breast cancer screening by mammogram  -     [facility] FLAVIO 2D+3D SCREENING BILAT (CPT=77067/32769); Future       Unremarkable breast and pelvic exam s/p total hysterectomy BSO  Discussed if in future low libido is an issue and has new partner, can discuss medical options e.g. estratest. Pt doing fine now.    Healthcare maintenance:  Pap: N/A - s/p total hyst and previously all normal paps   Mammogram: ordered  Colonoscopy: done 2017, per pt no polyps, return 10y. Pt had bad reaction to prep due to gastric bypass  DEXA, age 67 (earlier if postmenopausal with personal/fam hx fragility fx, underweight, smoking/excessive ETOH, steroids, RA)        Sindhu Orantes MD  [facility] - OBGYN

## (undated) DEVICE — LAPAROTOMY CDS: Brand: MEDLINE INDUSTRIES, INC.

## (undated) DEVICE — LIGHT HANDLE

## (undated) DEVICE — PROXIMATE RELOADABLE LINEAR STAPLER: Brand: PROXIMATE

## (undated) DEVICE — DRAIN RELIAVAC 100CC

## (undated) DEVICE — SCD SLEEVE KNEE HI BLEND

## (undated) DEVICE — BLADE ELECTROSURG 4IN INSULATE

## (undated) DEVICE — STERILE POLYISOPRENE POWDER-FREE SURGICAL GLOVES: Brand: PROTEXIS

## (undated) DEVICE — VIOLET BRAIDED (POLYGLACTIN 910), SYNTHETIC ABSORBABLE SUTURE: Brand: COATED VICRYL

## (undated) DEVICE — SUTURE VICRYL 2-0

## (undated) DEVICE — PROXIMATE LINEAR STAPLER RELOADS: Brand: PROXIMATE

## (undated) DEVICE — KENDALL SCD EXPRESS SLEEVES, KNEE LENGTH, MEDIUM: Brand: KENDALL SCD

## (undated) DEVICE — PROXIMATE LINEAR CUTTER RELOAD (STNADARD) , BLUE, 55MM: Brand: PROXIMATE

## (undated) DEVICE — SUTURE VICRYL 3-0 SH

## (undated) DEVICE — SOL  .9 1000ML BTL

## (undated) DEVICE — Device

## (undated) DEVICE — STERILE SYNTHETIC POLYISOPRENE POWDER-FREE SURGICAL GLOVES WITH HYDROGEL COATING: Brand: PROTEXIS

## (undated) DEVICE — SUTURE ETHILON 2-0 FS

## (undated) DEVICE — SUTURE MONOCRYL 4-0 PS-2

## (undated) DEVICE — PROXIMATE RELOADABLE LINEAR CUTTER WITH SAFETY LOCK-OUT.  55MM LINEAR CUTTER.: Brand: PROXIMATE

## (undated) DEVICE — PROXIMATE RELOADABLE LINEAR CUTTER WITH SAFETY LOCK-OUT, 75MM: Brand: PROXIMATE

## (undated) DEVICE — SUTURE ETHIBOND EXCEL 1-0

## (undated) DEVICE — DRAIN CHANNEL 19FR BLAKE

## (undated) DEVICE — PROXIMATE SKIN STAPLERS (35 WIDE) CONTAINS 35 STAINLESS STEEL STAPLES (FIXED HEAD): Brand: PROXIMATE

## (undated) NOTE — Clinical Note
I had the pleasure of seeing Kareem Huynh on 11/11/2020. Please see my attached note.     Chichi Ricks MD FACS  [facility]

## (undated) NOTE — Clinical Note
I had the pleasure of seeing Enriqueta Morales on 2/8/2021. Please see my attached note.     Mayi Awad MD FACS  [facility]

## (undated) NOTE — LETTER
2021    Return to School / Work    Name: Olena Foster        : 1965    To Whom It May Concern,    Olena Foster had surgery on 2020 and is:    Able to return to school / work with restrictions:  Rachel Corbett is able to return to work on 2/15/2021

## (undated) NOTE — Clinical Note
I had the pleasure of seeing Alondra Hansonanos on 11/15/2021. Please see my attached note.     Mateo Betts MD FACS  [facility]

## (undated) NOTE — Clinical Note
I had the pleasure of seeing Pete Rodgers on 4/19/2021. Please see my attached note.     Enoch Madrigal MD FACS  [facility]

## (undated) NOTE — LETTER
Date & Time: 6/2/2019, 12:56 PM  Patient: Nikhil Vázquez  Encounter Provider(s):    Mathew James MD       To Whom It May Concern:    Patti Connell was seen and treated in our department on 6/2/2019. She should not return to work until 6/4/19.     If you ha

## (undated) NOTE — LETTER
2022    Return to School / Work    Name: Breana Barlow        : 1965    To Whom It May Concern,    Breana Barlow had surgery on 2021 and is:    Able to return to school / work with restrictions:  Part Time Until 2022. Able to return to work on 2022 with restrictions listed above. Comments:    If there are any further questions regarding this patient, please do not hesitate to call me at [phone].     Sincerely,    Armando Connor MD